# Patient Record
Sex: MALE | Race: WHITE | NOT HISPANIC OR LATINO | Employment: FULL TIME | URBAN - METROPOLITAN AREA
[De-identification: names, ages, dates, MRNs, and addresses within clinical notes are randomized per-mention and may not be internally consistent; named-entity substitution may affect disease eponyms.]

---

## 2017-07-25 ENCOUNTER — HOSPITAL ENCOUNTER (OUTPATIENT)
Dept: RADIOLOGY | Facility: HOSPITAL | Age: 52
Discharge: HOME | End: 2017-07-25
Attending: INTERNAL MEDICINE
Payer: COMMERCIAL

## 2017-07-25 ENCOUNTER — TRANSCRIBE ORDERS (OUTPATIENT)
Dept: ADMINISTRATIVE | Facility: HOSPITAL | Age: 52
End: 2017-07-25

## 2017-07-25 DIAGNOSIS — R06.02 SOB (SHORTNESS OF BREATH): ICD-10-CM

## 2017-07-25 DIAGNOSIS — R06.02 SOB (SHORTNESS OF BREATH): Primary | ICD-10-CM

## 2017-07-25 PROCEDURE — 71020 HB CHEST X-RAY 2VW FRONTAL&LATL: CPT

## 2017-07-26 ENCOUNTER — TRANSCRIBE ORDERS (OUTPATIENT)
Dept: ADMINISTRATIVE | Facility: HOSPITAL | Age: 52
End: 2017-07-26

## 2017-07-26 DIAGNOSIS — J98.11 ATELECTASIS: Primary | ICD-10-CM

## 2017-08-01 ENCOUNTER — HOSPITAL ENCOUNTER (OUTPATIENT)
Dept: RADIOLOGY | Facility: HOSPITAL | Age: 52
Discharge: HOME | End: 2017-08-01
Attending: INTERNAL MEDICINE
Payer: COMMERCIAL

## 2017-08-01 DIAGNOSIS — J98.11 ATELECTASIS: ICD-10-CM

## 2017-08-01 PROCEDURE — 71260 CT THORAX DX C+: CPT

## 2017-08-01 RX ADMIN — IOHEXOL 100 ML: 350 INJECTION, SOLUTION INTRAVENOUS at 08:05

## 2017-09-06 ENCOUNTER — APPOINTMENT (OUTPATIENT)
Dept: RADIOLOGY | Facility: CLINIC | Age: 52
End: 2017-09-06
Payer: COMMERCIAL

## 2017-09-06 ENCOUNTER — ALLSCRIPTS OFFICE VISIT (OUTPATIENT)
Dept: OTHER | Facility: OTHER | Age: 52
End: 2017-09-06

## 2017-09-06 DIAGNOSIS — M25.522 PAIN IN LEFT ELBOW: ICD-10-CM

## 2017-09-06 PROCEDURE — 73080 X-RAY EXAM OF ELBOW: CPT

## 2017-10-19 ENCOUNTER — GENERIC CONVERSION - ENCOUNTER (OUTPATIENT)
Dept: OTHER | Facility: OTHER | Age: 52
End: 2017-10-19

## 2018-01-14 VITALS
SYSTOLIC BLOOD PRESSURE: 130 MMHG | HEIGHT: 73 IN | HEART RATE: 93 BPM | BODY MASS INDEX: 38.24 KG/M2 | DIASTOLIC BLOOD PRESSURE: 84 MMHG | WEIGHT: 288.5 LBS

## 2018-01-22 VITALS
BODY MASS INDEX: 38.43 KG/M2 | HEIGHT: 73 IN | DIASTOLIC BLOOD PRESSURE: 85 MMHG | HEART RATE: 88 BPM | SYSTOLIC BLOOD PRESSURE: 132 MMHG | WEIGHT: 290 LBS

## 2020-08-27 ENCOUNTER — APPOINTMENT (EMERGENCY)
Dept: RADIOLOGY | Facility: HOSPITAL | Age: 55
DRG: 189 | End: 2020-08-27
Payer: COMMERCIAL

## 2020-08-27 ENCOUNTER — HOSPITAL ENCOUNTER (INPATIENT)
Facility: HOSPITAL | Age: 55
LOS: 2 days | Discharge: HOME | DRG: 189 | End: 2020-08-29
Attending: EMERGENCY MEDICINE | Admitting: INTERNAL MEDICINE
Payer: COMMERCIAL

## 2020-08-27 DIAGNOSIS — R09.02 HYPOXIA: ICD-10-CM

## 2020-08-27 DIAGNOSIS — J44.1 COPD EXACERBATION (HCC): Primary | ICD-10-CM

## 2020-08-27 PROBLEM — J96.01 ACUTE RESPIRATORY FAILURE WITH HYPOXIA (HCC): Status: ACTIVE | Noted: 2020-08-27

## 2020-08-27 PROBLEM — E78.5 HYPERLIPIDEMIA: Status: ACTIVE | Noted: 2020-08-27

## 2020-08-27 PROBLEM — G47.30 SLEEP APNEA: Status: ACTIVE | Noted: 2020-08-27

## 2020-08-27 LAB
ALBUMIN SERPL BCP-MCNC: 3.7 G/DL (ref 3.5–5)
ALP SERPL-CCNC: 54 U/L (ref 46–116)
ALT SERPL W P-5'-P-CCNC: 41 U/L (ref 12–78)
ANION GAP SERPL CALCULATED.3IONS-SCNC: 10 MMOL/L (ref 4–13)
APTT PPP: 30 SECONDS (ref 23–37)
ARTERIAL PATENCY WRIST A: YES
AST SERPL W P-5'-P-CCNC: 19 U/L (ref 5–45)
BASE EXCESS BLDA CALC-SCNC: -0.8 MMOL/L
BASOPHILS # BLD AUTO: 0.15 THOUSANDS/ΜL (ref 0–0.1)
BASOPHILS NFR BLD AUTO: 1 % (ref 0–1)
BILIRUB SERPL-MCNC: 0.5 MG/DL (ref 0.2–1)
BILIRUB UR QL STRIP: NEGATIVE
BODY TEMPERATURE: 98.2 DEGREES FEHRENHEIT
BUN SERPL-MCNC: 17 MG/DL (ref 5–25)
CALCIUM SERPL-MCNC: 8.8 MG/DL (ref 8.3–10.1)
CHLORIDE SERPL-SCNC: 102 MMOL/L (ref 100–108)
CLARITY UR: CLEAR
CO2 SERPL-SCNC: 26 MMOL/L (ref 21–32)
COLOR UR: COLORLESS
CREAT SERPL-MCNC: 0.91 MG/DL (ref 0.6–1.3)
D DIMER PPP FEU-MCNC: 0.3 UG/ML FEU
EOSINOPHIL # BLD AUTO: 0.82 THOUSAND/ΜL (ref 0–0.61)
EOSINOPHIL NFR BLD AUTO: 8 % (ref 0–6)
ERYTHROCYTE [DISTWIDTH] IN BLOOD BY AUTOMATED COUNT: 12.2 % (ref 11.6–15.1)
GFR SERPL CREATININE-BSD FRML MDRD: 93 ML/MIN/1.73SQ M
GLUCOSE SERPL-MCNC: 98 MG/DL (ref 65–140)
GLUCOSE UR STRIP-MCNC: NEGATIVE MG/DL
HCO3 BLDA-SCNC: 23.3 MMOL/L (ref 22–28)
HCT VFR BLD AUTO: 45.7 % (ref 36.5–49.3)
HGB BLD-MCNC: 14.9 G/DL (ref 12–17)
HGB UR QL STRIP.AUTO: NEGATIVE
IMM GRANULOCYTES # BLD AUTO: 0.04 THOUSAND/UL (ref 0–0.2)
IMM GRANULOCYTES NFR BLD AUTO: 0 % (ref 0–2)
INR PPP: 1.08 (ref 0.84–1.19)
KETONES UR STRIP-MCNC: NEGATIVE MG/DL
LACTATE SERPL-SCNC: 0.8 MMOL/L (ref 0.5–2)
LEUKOCYTE ESTERASE UR QL STRIP: NEGATIVE
LYMPHOCYTES # BLD AUTO: 3.46 THOUSANDS/ΜL (ref 0.6–4.47)
LYMPHOCYTES NFR BLD AUTO: 32 % (ref 14–44)
MCH RBC QN AUTO: 30.8 PG (ref 26.8–34.3)
MCHC RBC AUTO-ENTMCNC: 32.6 G/DL (ref 31.4–37.4)
MCV RBC AUTO: 94 FL (ref 82–98)
MONOCYTES # BLD AUTO: 0.62 THOUSAND/ΜL (ref 0.17–1.22)
MONOCYTES NFR BLD AUTO: 6 % (ref 4–12)
NASAL CANNULA: 4
NEUTROPHILS # BLD AUTO: 5.63 THOUSANDS/ΜL (ref 1.85–7.62)
NEUTS SEG NFR BLD AUTO: 53 % (ref 43–75)
NITRITE UR QL STRIP: NEGATIVE
NRBC BLD AUTO-RTO: 0 /100 WBCS
NT-PROBNP SERPL-MCNC: 24 PG/ML
O2 CT BLDA-SCNC: 20.2 ML/DL (ref 16–23)
OXYHGB MFR BLDA: 91.4 % (ref 94–97)
PCO2 BLDA: 37.3 MM HG (ref 36–44)
PCO2 TEMP ADJ BLDA: 37 MM HG (ref 36–44)
PH BLD: 7.42 [PH] (ref 7.35–7.45)
PH BLDA: 7.41 [PH] (ref 7.35–7.45)
PH UR STRIP.AUTO: 6 [PH]
PLATELET # BLD AUTO: 238 THOUSANDS/UL (ref 149–390)
PMV BLD AUTO: 9.1 FL (ref 8.9–12.7)
PO2 BLD: 65 MM HG (ref 75–129)
PO2 BLDA: 65.9 MM HG (ref 75–129)
POTASSIUM SERPL-SCNC: 3.8 MMOL/L (ref 3.5–5.3)
PROT SERPL-MCNC: 7 G/DL (ref 6.4–8.2)
PROT UR STRIP-MCNC: NEGATIVE MG/DL
PROTHROMBIN TIME: 13.9 SECONDS (ref 11.6–14.5)
RBC # BLD AUTO: 4.84 MILLION/UL (ref 3.88–5.62)
SARS-COV-2 RNA RESP QL NAA+PROBE: NEGATIVE
SODIUM SERPL-SCNC: 138 MMOL/L (ref 136–145)
SP GR UR STRIP.AUTO: <=1.005 (ref 1–1.03)
SPECIMEN SOURCE: ABNORMAL
TROPONIN I SERPL-MCNC: <0.02 NG/ML
UROBILINOGEN UR QL STRIP.AUTO: 0.2 E.U./DL
WBC # BLD AUTO: 10.72 THOUSAND/UL (ref 4.31–10.16)

## 2020-08-27 PROCEDURE — 94640 AIRWAY INHALATION TREATMENT: CPT

## 2020-08-27 PROCEDURE — 84145 PROCALCITONIN (PCT): CPT | Performed by: NURSE PRACTITIONER

## 2020-08-27 PROCEDURE — 85730 THROMBOPLASTIN TIME PARTIAL: CPT | Performed by: EMERGENCY MEDICINE

## 2020-08-27 PROCEDURE — 82805 BLOOD GASES W/O2 SATURATION: CPT | Performed by: EMERGENCY MEDICINE

## 2020-08-27 PROCEDURE — 36415 COLL VENOUS BLD VENIPUNCTURE: CPT | Performed by: EMERGENCY MEDICINE

## 2020-08-27 PROCEDURE — 83605 ASSAY OF LACTIC ACID: CPT | Performed by: EMERGENCY MEDICINE

## 2020-08-27 PROCEDURE — 94660 CPAP INITIATION&MGMT: CPT

## 2020-08-27 PROCEDURE — 83880 ASSAY OF NATRIURETIC PEPTIDE: CPT | Performed by: EMERGENCY MEDICINE

## 2020-08-27 PROCEDURE — 81003 URINALYSIS AUTO W/O SCOPE: CPT | Performed by: EMERGENCY MEDICINE

## 2020-08-27 PROCEDURE — 99285 EMERGENCY DEPT VISIT HI MDM: CPT

## 2020-08-27 PROCEDURE — 96374 THER/PROPH/DIAG INJ IV PUSH: CPT

## 2020-08-27 PROCEDURE — 94002 VENT MGMT INPAT INIT DAY: CPT

## 2020-08-27 PROCEDURE — 94664 DEMO&/EVAL PT USE INHALER: CPT

## 2020-08-27 PROCEDURE — 94760 N-INVAS EAR/PLS OXIMETRY 1: CPT

## 2020-08-27 PROCEDURE — 85025 COMPLETE CBC W/AUTO DIFF WBC: CPT | Performed by: EMERGENCY MEDICINE

## 2020-08-27 PROCEDURE — 99223 1ST HOSP IP/OBS HIGH 75: CPT | Performed by: NURSE PRACTITIONER

## 2020-08-27 PROCEDURE — 71045 X-RAY EXAM CHEST 1 VIEW: CPT

## 2020-08-27 PROCEDURE — 36600 WITHDRAWAL OF ARTERIAL BLOOD: CPT

## 2020-08-27 PROCEDURE — 87040 BLOOD CULTURE FOR BACTERIA: CPT | Performed by: EMERGENCY MEDICINE

## 2020-08-27 PROCEDURE — 80053 COMPREHEN METABOLIC PANEL: CPT | Performed by: EMERGENCY MEDICINE

## 2020-08-27 PROCEDURE — 84484 ASSAY OF TROPONIN QUANT: CPT | Performed by: EMERGENCY MEDICINE

## 2020-08-27 PROCEDURE — 85610 PROTHROMBIN TIME: CPT | Performed by: EMERGENCY MEDICINE

## 2020-08-27 PROCEDURE — 93005 ELECTROCARDIOGRAM TRACING: CPT

## 2020-08-27 PROCEDURE — 85379 FIBRIN DEGRADATION QUANT: CPT | Performed by: EMERGENCY MEDICINE

## 2020-08-27 PROCEDURE — 99285 EMERGENCY DEPT VISIT HI MDM: CPT | Performed by: EMERGENCY MEDICINE

## 2020-08-27 PROCEDURE — 87635 SARS-COV-2 COVID-19 AMP PRB: CPT | Performed by: EMERGENCY MEDICINE

## 2020-08-27 RX ORDER — ATORVASTATIN CALCIUM 10 MG/1
10 TABLET, FILM COATED ORAL DAILY
Status: DISCONTINUED | OUTPATIENT
Start: 2020-08-28 | End: 2020-08-29 | Stop reason: HOSPADM

## 2020-08-27 RX ORDER — FLUTICASONE PROPIONATE 50 MCG
2 SPRAY, SUSPENSION (ML) NASAL DAILY
COMMUNITY

## 2020-08-27 RX ORDER — IPRATROPIUM BROMIDE AND ALBUTEROL SULFATE 2.5; .5 MG/3ML; MG/3ML
3 SOLUTION RESPIRATORY (INHALATION)
Status: DISCONTINUED | OUTPATIENT
Start: 2020-08-27 | End: 2020-08-28

## 2020-08-27 RX ORDER — IPRATROPIUM BROMIDE AND ALBUTEROL SULFATE 2.5; .5 MG/3ML; MG/3ML
3 SOLUTION RESPIRATORY (INHALATION) ONCE
Status: COMPLETED | OUTPATIENT
Start: 2020-08-27 | End: 2020-08-27

## 2020-08-27 RX ORDER — LORATADINE 10 MG/1
10 TABLET ORAL DAILY
Status: DISCONTINUED | OUTPATIENT
Start: 2020-08-28 | End: 2020-08-29 | Stop reason: HOSPADM

## 2020-08-27 RX ORDER — METHYLPREDNISOLONE SODIUM SUCCINATE 40 MG/ML
40 INJECTION, POWDER, LYOPHILIZED, FOR SOLUTION INTRAMUSCULAR; INTRAVENOUS EVERY 8 HOURS
Status: DISCONTINUED | OUTPATIENT
Start: 2020-08-28 | End: 2020-08-28

## 2020-08-27 RX ORDER — ALBUTEROL SULFATE 90 UG/1
2 AEROSOL, METERED RESPIRATORY (INHALATION) EVERY 4 HOURS PRN
COMMUNITY
Start: 2020-07-20 | End: 2021-07-20

## 2020-08-27 RX ORDER — FLUTICASONE PROPIONATE 50 MCG
2 SPRAY, SUSPENSION (ML) NASAL DAILY
Status: DISCONTINUED | OUTPATIENT
Start: 2020-08-28 | End: 2020-08-29 | Stop reason: HOSPADM

## 2020-08-27 RX ORDER — ASPIRIN 81 MG/1
81 TABLET, CHEWABLE ORAL DAILY
Status: DISCONTINUED | OUTPATIENT
Start: 2020-08-28 | End: 2020-08-29 | Stop reason: HOSPADM

## 2020-08-27 RX ORDER — METHYLPREDNISOLONE SODIUM SUCCINATE 125 MG/2ML
125 INJECTION, POWDER, LYOPHILIZED, FOR SOLUTION INTRAMUSCULAR; INTRAVENOUS ONCE
Status: COMPLETED | OUTPATIENT
Start: 2020-08-27 | End: 2020-08-27

## 2020-08-27 RX ORDER — ATORVASTATIN CALCIUM 10 MG/1
10 TABLET, FILM COATED ORAL DAILY
COMMUNITY
Start: 2020-03-11 | End: 2021-03-11

## 2020-08-27 RX ORDER — TIOTROPIUM BROMIDE AND OLODATEROL 3.124; 2.736 UG/1; UG/1
2 SPRAY, METERED RESPIRATORY (INHALATION) DAILY
COMMUNITY
Start: 2020-08-27

## 2020-08-27 RX ORDER — SODIUM CHLORIDE 9 MG/ML
75 INJECTION, SOLUTION INTRAVENOUS CONTINUOUS
Status: DISCONTINUED | OUTPATIENT
Start: 2020-08-27 | End: 2020-08-28

## 2020-08-27 RX ORDER — LORATADINE 10 MG/1
TABLET ORAL
COMMUNITY
Start: 2020-08-27

## 2020-08-27 RX ADMIN — SODIUM CHLORIDE 75 ML/HR: 0.9 INJECTION, SOLUTION INTRAVENOUS at 23:04

## 2020-08-27 RX ADMIN — IPRATROPIUM BROMIDE AND ALBUTEROL SULFATE 3 ML: 2.5; .5 SOLUTION RESPIRATORY (INHALATION) at 22:27

## 2020-08-27 RX ADMIN — METHYLPREDNISOLONE SODIUM SUCCINATE 125 MG: 125 INJECTION, POWDER, FOR SOLUTION INTRAMUSCULAR; INTRAVENOUS at 17:47

## 2020-08-27 RX ADMIN — IPRATROPIUM BROMIDE AND ALBUTEROL SULFATE 3 ML: 2.5; .5 SOLUTION RESPIRATORY (INHALATION) at 17:41

## 2020-08-28 LAB
ANION GAP SERPL CALCULATED.3IONS-SCNC: 9 MMOL/L (ref 4–13)
BUN SERPL-MCNC: 18 MG/DL (ref 5–25)
CALCIUM SERPL-MCNC: 8.6 MG/DL (ref 8.3–10.1)
CHLORIDE SERPL-SCNC: 104 MMOL/L (ref 100–108)
CO2 SERPL-SCNC: 22 MMOL/L (ref 21–32)
CREAT SERPL-MCNC: 0.93 MG/DL (ref 0.6–1.3)
ERYTHROCYTE [DISTWIDTH] IN BLOOD BY AUTOMATED COUNT: 12.2 % (ref 11.6–15.1)
GFR SERPL CREATININE-BSD FRML MDRD: 91 ML/MIN/1.73SQ M
GLUCOSE SERPL-MCNC: 158 MG/DL (ref 65–140)
HCT VFR BLD AUTO: 46.9 % (ref 36.5–49.3)
HGB BLD-MCNC: 14.9 G/DL (ref 12–17)
MCH RBC QN AUTO: 30.5 PG (ref 26.8–34.3)
MCHC RBC AUTO-ENTMCNC: 31.8 G/DL (ref 31.4–37.4)
MCV RBC AUTO: 96 FL (ref 82–98)
PLATELET # BLD AUTO: 223 THOUSANDS/UL (ref 149–390)
PMV BLD AUTO: 9.5 FL (ref 8.9–12.7)
POTASSIUM SERPL-SCNC: 4.5 MMOL/L (ref 3.5–5.3)
PROCALCITONIN SERPL-MCNC: <0.05 NG/ML
RBC # BLD AUTO: 4.89 MILLION/UL (ref 3.88–5.62)
SODIUM SERPL-SCNC: 135 MMOL/L (ref 136–145)
WBC # BLD AUTO: 7.26 THOUSAND/UL (ref 4.31–10.16)

## 2020-08-28 PROCEDURE — 94640 AIRWAY INHALATION TREATMENT: CPT

## 2020-08-28 PROCEDURE — 99232 SBSQ HOSP IP/OBS MODERATE 35: CPT | Performed by: INTERNAL MEDICINE

## 2020-08-28 PROCEDURE — 94660 CPAP INITIATION&MGMT: CPT

## 2020-08-28 PROCEDURE — 85027 COMPLETE CBC AUTOMATED: CPT | Performed by: NURSE PRACTITIONER

## 2020-08-28 PROCEDURE — 94760 N-INVAS EAR/PLS OXIMETRY 1: CPT

## 2020-08-28 PROCEDURE — 80048 BASIC METABOLIC PNL TOTAL CA: CPT | Performed by: NURSE PRACTITIONER

## 2020-08-28 PROCEDURE — 94664 DEMO&/EVAL PT USE INHALER: CPT

## 2020-08-28 PROCEDURE — 93005 ELECTROCARDIOGRAM TRACING: CPT

## 2020-08-28 RX ORDER — LEVALBUTEROL INHALATION SOLUTION 0.63 MG/3ML
0.63 SOLUTION RESPIRATORY (INHALATION) EVERY 6 HOURS PRN
Status: DISCONTINUED | OUTPATIENT
Start: 2020-08-28 | End: 2020-08-29 | Stop reason: HOSPADM

## 2020-08-28 RX ORDER — LEVALBUTEROL INHALATION SOLUTION 0.63 MG/3ML
0.63 SOLUTION RESPIRATORY (INHALATION)
Status: DISCONTINUED | OUTPATIENT
Start: 2020-08-28 | End: 2020-08-29 | Stop reason: HOSPADM

## 2020-08-28 RX ORDER — ACETAMINOPHEN 325 MG/1
650 TABLET ORAL EVERY 6 HOURS PRN
Status: DISCONTINUED | OUTPATIENT
Start: 2020-08-28 | End: 2020-08-29 | Stop reason: HOSPADM

## 2020-08-28 RX ORDER — METHYLPREDNISOLONE SODIUM SUCCINATE 40 MG/ML
40 INJECTION, POWDER, LYOPHILIZED, FOR SOLUTION INTRAMUSCULAR; INTRAVENOUS EVERY 12 HOURS SCHEDULED
Status: DISCONTINUED | OUTPATIENT
Start: 2020-08-28 | End: 2020-08-29 | Stop reason: HOSPADM

## 2020-08-28 RX ORDER — GUAIFENESIN 600 MG
600 TABLET, EXTENDED RELEASE 12 HR ORAL EVERY 12 HOURS SCHEDULED
Status: DISCONTINUED | OUTPATIENT
Start: 2020-08-28 | End: 2020-08-29 | Stop reason: HOSPADM

## 2020-08-28 RX ADMIN — METHYLPREDNISOLONE SODIUM SUCCINATE 40 MG: 40 INJECTION, POWDER, FOR SOLUTION INTRAMUSCULAR; INTRAVENOUS at 20:47

## 2020-08-28 RX ADMIN — GUAIFENESIN 600 MG: 600 TABLET, EXTENDED RELEASE ORAL at 20:47

## 2020-08-28 RX ADMIN — IPRATROPIUM BROMIDE 0.5 MG: 0.5 SOLUTION RESPIRATORY (INHALATION) at 20:04

## 2020-08-28 RX ADMIN — FLUTICASONE PROPIONATE 2 SPRAY: 50 SPRAY, METERED NASAL at 08:35

## 2020-08-28 RX ADMIN — ENOXAPARIN SODIUM 40 MG: 40 INJECTION SUBCUTANEOUS at 08:35

## 2020-08-28 RX ADMIN — LORATADINE 10 MG: 10 TABLET ORAL at 08:35

## 2020-08-28 RX ADMIN — ATORVASTATIN CALCIUM 10 MG: 10 TABLET, FILM COATED ORAL at 08:35

## 2020-08-28 RX ADMIN — METHYLPREDNISOLONE SODIUM SUCCINATE 40 MG: 40 INJECTION, POWDER, FOR SOLUTION INTRAMUSCULAR; INTRAVENOUS at 05:20

## 2020-08-28 RX ADMIN — IPRATROPIUM BROMIDE AND ALBUTEROL SULFATE 3 ML: 2.5; .5 SOLUTION RESPIRATORY (INHALATION) at 14:30

## 2020-08-28 RX ADMIN — IPRATROPIUM BROMIDE AND ALBUTEROL SULFATE 3 ML: 2.5; .5 SOLUTION RESPIRATORY (INHALATION) at 02:42

## 2020-08-28 RX ADMIN — ASPIRIN 81 MG 81 MG: 81 TABLET ORAL at 08:35

## 2020-08-28 RX ADMIN — IPRATROPIUM BROMIDE AND ALBUTEROL SULFATE 3 ML: 2.5; .5 SOLUTION RESPIRATORY (INHALATION) at 07:22

## 2020-08-28 RX ADMIN — LEVALBUTEROL HYDROCHLORIDE 0.63 MG: 0.63 SOLUTION RESPIRATORY (INHALATION) at 20:04

## 2020-08-28 NOTE — UTILIZATION REVIEW
Initial Clinical Review    Admission: Date/Time/Statement:   Admission Orders (From admission, onward)     Ordered        08/27/20 1924  Inpatient Admission  Once                   Orders Placed This Encounter   Procedures    Inpatient Admission     Standing Status:   Standing     Number of Occurrences:   1     Order Specific Question:   Admitting Physician     Answer:   Evangelina Moody     Order Specific Question:   Level of Care     Answer:   Med Surg [16]     Order Specific Question:   Estimated length of stay     Answer:   More than 2 Midnights     Order Specific Question:   Certification     Answer:   I certify that inpatient services are medically necessary for this patient for a duration of greater than two midnights  See H&P and MD Progress Notes for additional information about the patient's course of treatment  ED Arrival Information     Expected Arrival Acuity Means of Arrival Escorted By Service Admission Type    - 8/27/2020 16:45 Emergent Walk-In Family Member Hospitalist Emergency    Arrival Complaint    shortness of breath        Chief Complaint   Patient presents with    Shortness of Breath     Patient with hx of COPD, quit smoking May, on CPAP at [facility], no pulmonologist  For past 3-4 days having a hard time breathing and his inhaler is not working  Coughing up white to green sputum last 2 weeks  Sat 88-91 % with severe MIRANDA     Assessment/Plan: 61 yo male to ED from home pmh COPD,recently stopped smoking and since that time having breathing difficulties  In ED he is tachycardia, tachypnea ,accessory muscle usage, decreased breath sounds with wheezing  Pt admitted Inpatient admission with acute exacerbation COPD ,acute hypoxic respiratory failure  sats 88% in ED continue duonebs rtc, IV Solumedrol , supplement oxygen requiring 4L nc    Sleep apnea continue cpap hx    8/25  Tachypnea with mild conversation,decreased breath sounds ,wheezing continue IV Solumedrol same dosing and nebs atc ,wean oxygen as able  ED Triage Vitals   Temperature Pulse Respirations Blood Pressure SpO2   08/27/20 1700 08/27/20 1700 08/27/20 1700 08/27/20 1700 08/27/20 1700   98 2 °F (36 8 °C) 102 (!) 32 147/82 90 %      Temp Source Heart Rate Source Patient Position - Orthostatic VS BP Location FiO2 (%)   08/27/20 1700 08/27/20 1700 08/27/20 1700 08/27/20 1700 --   Tympanic Monitor Sitting Left arm       Pain Score       08/27/20 2045       No Pain          Wt Readings from Last 1 Encounters:   08/27/20 127 kg (281 lb 1 4 oz)     Additional Vital Signs:   08/27/20 2230      96   20         91 %   36   4 L/min   Nasal cannula       08/27/20 2207                  90 %         Nasal cannula       08/27/20 20:49:59   98 2 °F (36 8 °C)   97   19   157/90   112   90 %   36   4 L/min   Nasal cannula   Lying    08/27/20 2000      94   16   154/82   109   91 %   36   4 L/min   Nasal cannula       08/27/20 1935                           Nasal cannula       08/27/20 1930      98   18   160/82   115   91 %   36   4 L/min          08/27/20 1915      92   23Abnormal           90 %   36   4 L/min   Nasal cannula       08/27/20 1829      95   20   147/80      90 %   36   4 L/min   Nasal cannula   Sitting    08/27/20 1801      99   24Abnormal     154/76      91 %   36   4 L/min   Nasal cannula       Pertinent Labs/Diagnostic Test Results:   8/27/20 CXR Asymmetric right basal opacity, favor asymmetric chest wall tissue, however may also represent layering pleural effusion   Results from last 7 days   Lab Units 08/27/20  1912   SARS-COV-2  Negative     Results from last 7 days   Lab Units 08/28/20  0516 08/27/20  1746   WBC Thousand/uL 7 26 10 72*   HEMOGLOBIN g/dL 14 9 14 9   HEMATOCRIT % 46 9 45 7   PLATELETS Thousands/uL 223 238   NEUTROS ABS Thousands/µL  --  5 63     Results from last 7 days   Lab Units 08/28/20  0516 08/27/20  1746   SODIUM mmol/L 135* 138   POTASSIUM mmol/L 4 5 3 8   CHLORIDE mmol/L 104 102   CO2 mmol/L 22 26   ANION GAP mmol/L 9 10   BUN mg/dL 18 17   CREATININE mg/dL 0 93 0 91   EGFR ml/min/1 73sq m 91 93   CALCIUM mg/dL 8 6 8 8     Results from last 7 days   Lab Units 08/27/20  1746   AST U/L 19   ALT U/L 41   ALK PHOS U/L 54   TOTAL PROTEIN g/dL 7 0   ALBUMIN g/dL 3 7   TOTAL BILIRUBIN mg/dL 0 50     Results from last 7 days   Lab Units 08/28/20  0516 08/27/20  1746   GLUCOSE RANDOM mg/dL 158* 98      Results from last 7 days   Lab Units 08/27/20  1903   PH ART  7 414   PCO2 ART mm Hg 37 3   PO2 ART mm Hg 65 9*   HCO3 ART mmol/L 23 3   BASE EXC ART mmol/L -0 8   O2 CONTENT ART mL/dL 20 2   O2 HGB, ARTERIAL % 91 4*   ABG SOURCE  Radial, Left     Results from last 7 days   Lab Units 08/27/20  1746   TROPONIN I ng/mL <0 02     Results from last 7 days   Lab Units 08/27/20  1746   D-DIMER QUANTITATIVE ug/ml FEU 0 30     Results from last 7 days   Lab Units 08/27/20  1746   PROTIME seconds 13 9   INR  1 08   PTT seconds 30     Results from last 7 days   Lab Units 08/27/20  2303   PROCALCITONIN ng/ml <0 05     Results from last 7 days   Lab Units 08/27/20  1844   LACTIC ACID mmol/L 0 8     Results from last 7 days   Lab Units 08/27/20  1746   NT-PRO BNP pg/mL 24     Results from last 7 days   Lab Units 08/27/20  1747   CLARITY UA  Clear   COLOR UA  Colorless   SPEC GRAV UA  <=1 005   PH UA  6 0   GLUCOSE UA mg/dl Negative   KETONES UA mg/dl Negative   BLOOD UA  Negative   PROTEIN UA mg/dl Negative   NITRITE UA  Negative   BILIRUBIN UA  Negative   UROBILINOGEN UA E U /dl 0 2   LEUKOCYTES UA  Negative     Results from last 7 days   Lab Units 08/27/20  1844   BLOOD CULTURE  Received in Microbiology Lab  Culture in Progress  Received in Microbiology Lab  Culture in Progress         ED Treatment:   Medication Administration from 08/27/2020 1645 to 08/27/2020 2040       Date/Time Order Dose Route Action Action by Comments     08/27/2020 1741 ipratropium-albuterol (DUO-NEB) 0 5-2 5 mg/3 mL inhalation solution 3 mL 3 mL Nebulization Given Bandar Javier RN      08/27/2020 1747 methylPREDNISolone sodium succinate (Solu-MEDROL) injection 125 mg 125 mg Intravenous Given Bandar Javier RN         Past Medical History:   Diagnosis Date    COPD (chronic obstructive pulmonary disease) ([facility] [address] )     History of continuous positive airway pressure (CPAP) therapy at home     Hx of seasonal allergies     Sleep apnea      Present on Admission:   COPD exacerbation (HCC)      Admitting Diagnosis: Shortness of breath [R06 02]  Hypoxia [R09 02]  COPD exacerbation (HCC) [J44 1]  Age/Sex: 60 y o  male  Admission Orders:  gmf  Respiratory protocol  cpap hs   Oxygen 4L  Nebs q6hr and prn   cmp cbc diff   procalcitonin  IS  Scheduled Medications:  aspirin, 81 mg, Oral, Daily  atorvastatin, 10 mg, Oral, Daily  enoxaparin, 40 mg, Subcutaneous, Q24H DARA  fluticasone, 2 spray, Nasal, Daily  ipratropium-albuterol, 3 mL, Nebulization, Q6H  loratadine, 10 mg, Oral, Daily  methylPREDNISolone sodium succinate, 40 mg, Intravenous, Q12H [facility] & halfway      Continuous IV Infusions:     PRN Meds:       None    Network Utilization Review Department  [email or web address]  ATTENTION: Please call with any questions or concerns to [phone] and carefully listen to the prompts so that you are directed to the right person  All voicemails are confidential   Evaristo Delude all requests for admission clinical reviews, approved or denied determinations and any other requests to dedicated fax number below belonging to the campus where the patient is receiving treatment   List of dedicated fax numbers for the Facilities:  FACILITY NAME UR FAX NUMBER   ADMISSION DENIALS (Administrative/Medical Necessity) [phone]   [address]  (Maternity/NICU/Pediatrics) [phone]   Sanaz Wolf [address] [facility] [address] [facility] [phone]   Haven Grieves [facility] [phone] [address] [facility], [facility]  [phone]   [address] [place] [address] [phone]

## 2020-08-28 NOTE — H&P
H&P- Saintclair Parson 1965, 60 y o  male MRN: [id number]    Unit/Bed#: [address] [address] Encounter: [id number]    Primary Care Provider: Levar Horne PA-C   Date and time admitted to hospital: 8/27/2020  4:58 PM      * COPD exacerbation [facility])  Assessment & Plan  Patient presents to the emergency department complaints of shortness of breath with exertion for about 3 days  History of COPD  He has a chronic nonproductive cough, denies fevers or chills  Formal CXR read pending  · Admit to medicine  · Continue duonebs RTC  · Continue solumedrol at 40 mg IV q 8 hrs  · No antibiotics at this time  · Respiratory protocol  · Supplemental oxygen PRN    Acute respiratory failure with hypoxia ([address] )  Assessment & Plan  Secondary to COPD exacerbation  Plan per above    Hyperlipidemia  Assessment & Plan  Continue statin    Sleep apnea  Assessment & Plan  Continue CPAP at 13 per home routine    VTE Prophylaxis: Enoxaparin (Lovenox)  / sequential compression device   Code Status: Level 1 - Full Code    Anticipated Length of Stay:  Patient will be admitted on an Inpatient basis with an anticipated length of stay of greater than 2 midnights  Justification for Hospital Stay: COPD exacerbation     Total Time for Visit, including Counseling / Coordination of Care: 20 minutes  Greater than 50% of this total time spent on direct patient counseling and coordination of care  Chief Complaint:   Shortness of Breath (Patient with hx of COPD, quit smoking May, on CPAP at , no pulmonologist  For past 3-4 days having a hard time breathing and his inhaler is not working  Coughing up white to green sputum last 2 weeks  Sat 88-91 % with severe MIRANDA)      History of Present Illness:    Saintclair Parson is a 60 y o  male with a PMH of COPD, hyperlipidemia, obstructive sleep apnea who presents with shortness of breath  Patient states he has a long-time history of tobacco dependence most recently quitting in May    For the past 3-4 days he reports having difficulty breathing  He has been taking his p r n  Albuterol inhaler with little relief  He has a chronic nonproductive cough  On arrival to the emergency department he was found to have an oxygen saturation of 88%  ABG was normal aside from hypoxia  Labs are essentially unremarkable  Chest x-ray is pending formal read but does not appear to have any acute findings  Patient is admitted for acute respiratory failure with hypoxia secondary to COPD exacerbation  Review of Systems:    Review of Systems   Constitutional: Negative  HENT: Negative  Eyes: Negative  Respiratory: Positive for cough, shortness of breath and wheezing  Cardiovascular: Negative  Gastrointestinal: Negative  Endocrine: Negative  Genitourinary: Negative  Musculoskeletal: Negative  Skin: Negative  Allergic/Immunologic: Negative  Neurological: Negative  Hematological: Negative  Psychiatric/Behavioral: Negative  Past Medical and Surgical History:     Past Medical History:   Diagnosis Date    COPD (chronic obstructive pulmonary disease) ([facility] [address] )     History of continuous positive airway pressure (CPAP) therapy at home     Hx of seasonal allergies     Sleep apnea        Past Surgical History:   Procedure Laterality Date    APPENDECTOMY         Meds/Allergies:    Prior to Admission medications    Medication Sig Start Date End Date Taking?  Authorizing Provider   albuterol (PROVENTIL HFA,VENTOLIN HFA) 90 mcg/act inhaler Inhale 2 puffs every 4 (four) hours as needed 7/20/20 7/20/21 Yes Historical Provider, MD   ASPIRIN 81 PO Take 81 mg by mouth daily   Yes Historical Provider, MD   atorvastatin (LIPITOR) 10 mg tablet Take 10 mg by mouth daily 3/11/20 3/11/21 Yes Historical Provider, MD   fluticasone (FLONASE) 50 mcg/act nasal spray 2 sprays into each nostril daily   Yes Historical Provider, MD   loratadine (CLARITIN) 10 mg tablet TAKE ONE TABLET BY MOUTH EVERY DAY (Sloan Galo) 8/27/20  Yes Historical Provider, MD   tiotropium-olodaterol (Stiolto Respimat) 2 5-2 5 MCG/ACT inhaler Inhale 2 puffs daily 8/27/20  Yes Historical Provider, MD       Allergies: No Known Allergies    Social History:     Marital Status: Single   Substance Use History:   Social History     Substance and Sexual Activity   Alcohol Use Yes    Frequency: Monthly or less    Drinks per session: 1 or 2    Binge frequency: Never     Social History     Tobacco Use   Smoking Status Former Smoker    Packs/day: 2 00    Types: Cigarettes   Smokeless Tobacco Never Used   Tobacco Comment    started at age 10     Social History     Substance and Sexual Activity   Drug Use Never       Family History:    Family History   Problem Relation Age of Onset    Breast cancer Mother     Kidney failure Mother     Diabetes Maternal Grandmother        Physical Exam:     Vitals:   Blood Pressure: 157/90 (08/27/20 2049)  Pulse: 97 (08/27/20 2049)  Temperature: 98 2 °F (36 8 °C) (08/27/20 2049)  Temp Source: Oral (08/27/20 2049)  Respirations: 19 (08/27/20 2049)  Weight - Scale: 131 kg (288 lb) (08/27/20 1700)  SpO2: 90 % (08/27/20 2049)    Physical Exam  Constitutional:       Appearance: Normal appearance  HENT:      Head: Normocephalic and atraumatic  Nose: Nose normal    Eyes:      Extraocular Movements: Extraocular movements intact  Neck:      Musculoskeletal: Normal range of motion  Cardiovascular:      Rate and Rhythm: Normal rate and regular rhythm  Pulses: Normal pulses  Heart sounds: Murmur present  Pulmonary:      Effort: Pulmonary effort is normal  No respiratory distress  Breath sounds: Wheezing present  Abdominal:      General: Abdomen is flat  Bowel sounds are normal  There is no distension  Palpations: Abdomen is soft  Tenderness: There is no abdominal tenderness  Musculoskeletal: Normal range of motion  General: No swelling  Skin:     General: Skin is warm and dry  Neurological:      General: No focal deficit present  Mental Status: He is alert and oriented to person, place, and time  Psychiatric:         Mood and Affect: Mood normal          Behavior: Behavior normal            Additional Data:     Lab Results: I have personally reviewed pertinent reports  Results from last 7 days   Lab Units 08/27/20  1746   WBC Thousand/uL 10 72*   HEMOGLOBIN g/dL 14 9   HEMATOCRIT % 45 7   PLATELETS Thousands/uL 238   NEUTROS PCT % 53     Results from last 7 days   Lab Units 08/27/20  1746   SODIUM mmol/L 138   POTASSIUM mmol/L 3 8   CHLORIDE mmol/L 102   CO2 mmol/L 26   BUN mg/dL 17   CREATININE mg/dL 0 91   CALCIUM mg/dL 8 8   TOTAL BILIRUBIN mg/dL 0 50   ALK PHOS U/L 54   ALT U/L 41   AST U/L 19     Results from last 7 days   Lab Units 08/27/20  1746   INR  1 08     Results from last 7 days   Lab Units 08/27/20  1746   TROPONIN I ng/mL <0 02         Results from last 7 days   Lab Units 08/27/20  1844   LACTIC ACID mmol/L 0 8       Imaging: I have personally reviewed pertinent reports  XR chest 1 view portable    (Results Pending)       XR chest 1 view portable    (Results Pending)       EKG, Pathology, and Other Studies Reviewed on Admission:   · EKG: pending     Allscripts / Epic Records Reviewed: Yes     ** Please Note: This note has been constructed using a voice recognition system   **

## 2020-08-28 NOTE — PLAN OF CARE
Problem: INFECTION - ADULT  Goal: Absence or prevention of progression during hospitalization  Description: INTERVENTIONS:  - Assess and monitor for signs and symptoms of infection  - Monitor lab/diagnostic results  - Monitor all insertion sites, i e  indwelling lines, tubes, and drains  - Monitor endotracheal if appropriate and nasal secretions for changes in amount and color  - [place] appropriate cooling/warming therapies per order  - Administer medications as ordered  - Instruct and encourage patient and family to use good hand hygiene technique  - Identify and instruct in appropriate isolation precautions for identified infection/condition  Outcome: Progressing     Problem: SAFETY ADULT  Goal: Maintain or return to baseline ADL function  Description: INTERVENTIONS:  -  Assess patient's ability to carry out ADLs; assess patient's baseline for ADL function and identify physical deficits which impact ability to perform ADLs (bathing, care of mouth/teeth, toileting, grooming, dressing, etc )  - Assess/evaluate cause of self-care deficits   - Assess range of motion  - Assess patient's mobility; develop plan if impaired  - Assess patient's need for assistive devices and provide as appropriate  - Encourage maximum independence but intervene and supervise when necessary  - Involve family in performance of ADLs  - Assess for home care needs following discharge   - Consider OT consult to assist with ADL evaluation and planning for discharge  - Provide patient education as appropriate  Outcome: Progressing  Goal: Maintain or return mobility status to optimal level  Description: INTERVENTIONS:  - Assess patient's baseline mobility status (ambulation, transfers, stairs, etc )    - Identify cognitive and physical deficits and behaviors that affect mobility  - Identify mobility aids required to assist with transfers and/or ambulation (gait belt, sit-to-stand, lift, walker, cane, etc )  - [place] fall precautions as indicated by assessment  - Record patient progress and toleration of activity level on Mobility SBAR; progress patient to next Phase/Stage  - Instruct patient to call for assistance with activity based on assessment  - Consider rehabilitation consult to assist with strengthening/weightbearing, etc   Outcome: Progressing     Problem: DISCHARGE PLANNING  Goal: Discharge to home or other facility with appropriate resources  Description: INTERVENTIONS:  - Identify barriers to discharge w/patient and caregiver  - Arrange for needed discharge resources and transportation as appropriate  - Identify discharge learning needs (meds, wound care, etc )  support system  Outcome: Progressing     Problem: Knowledge Deficit  Goal: Patient/family/caregiver demonstrates understanding of disease process, treatment plan, medications, and discharge instructions  Description: Complete learning assessment and assess knowledge base    Interventions:  - Provide teaching at level of understanding  - Provide teaching via preferred learning methods  Outcome: Progressing     Problem: RESPIRATORY - ADULT  Goal: Achieves optimal ventilation and oxygenation  Description: INTERVENTIONS:  - Assess for changes in respiratory status  - Assess for changes in mentation and behavior  - Position to facilitate oxygenation and minimize respiratory effort  - Oxygen administered by appropriate delivery if ordered  - Initiate smoking cessation education as indicated  - Encourage broncho-pulmonary hygiene including cough, deep breathe, Incentive Spirometry  - Assess the need for suctioning and aspirate as needed  - Assess and instruct to report SOB or any respiratory difficulty  - Respiratory Therapy support as indicated  Outcome: Progressing  Goal: Achieves optimal ventilation and oxygenation  Description: INTERVENTIONS:  Outcome: Progressing

## 2020-08-28 NOTE — ASSESSMENT & PLAN NOTE
Patient presents to the emergency department complaints of shortness of breath with exertion for about 3 days  History of COPD  He has a chronic nonproductive cough, denies fevers or chills  Formal CXR read pending     · Admit to medicine  · Continue duonebs RTC  · Continue solumedrol at 40 mg IV q 8 hrs  · No antibiotics at this time  · Respiratory protocol  · Supplemental oxygen PRN

## 2020-08-28 NOTE — RESPIRATORY THERAPY NOTE
surya completed with patient  Patient performed adequately achieved 45 ml/min    Patient also given aerochamber for home use of mdi

## 2020-08-28 NOTE — PLAN OF CARE
Problem: INFECTION - ADULT  Goal: Absence or prevention of progression during hospitalization  Description: INTERVENTIONS:  - Assess and monitor for signs and symptoms of infection  - Monitor lab/diagnostic results  - Monitor all insertion sites, i e  indwelling lines, tubes, and drains  - Monitor endotracheal if appropriate and nasal secretions for changes in amount and color  - [place] appropriate cooling/warming therapies per order  - Administer medications as ordered  - Instruct and encourage patient and family to use good hand hygiene technique  - Identify and instruct in appropriate isolation precautions for identified infection/condition  Outcome: Progressing     Problem: SAFETY ADULT  Goal: Maintain or return to baseline ADL function  Description: INTERVENTIONS:  -  Assess patient's ability to carry out ADLs; assess patient's baseline for ADL function and identify physical deficits which impact ability to perform ADLs (bathing, care of mouth/teeth, toileting, grooming, dressing, etc )  - Assess/evaluate cause of self-care deficits   - Assess range of motion  - Assess patient's mobility; develop plan if impaired  - Assess patient's need for assistive devices and provide as appropriate  - Encourage maximum independence but intervene and supervise when necessary  - Involve family in performance of ADLs  - Assess for home care needs following discharge   - Consider OT consult to assist with ADL evaluation and planning for discharge  - Provide patient education as appropriate  Outcome: Progressing  Goal: Maintain or return mobility status to optimal level  Description: INTERVENTIONS:  - Assess patient's baseline mobility status (ambulation, transfers, stairs, etc )    - Identify cognitive and physical deficits and behaviors that affect mobility  - Identify mobility aids required to assist with transfers and/or ambulation (gait belt, sit-to-stand, lift, walker, cane, etc )  - [place] fall precautions as indicated by assessment  - Record patient progress and toleration of activity level on Mobility SBAR; progress patient to next Phase/Stage  - Instruct patient to call for assistance with activity based on assessment  - Consider rehabilitation consult to assist with strengthening/weightbearing, etc   Outcome: Progressing     Problem: DISCHARGE PLANNING  Goal: Discharge to home or other facility with appropriate resources  Description: INTERVENTIONS:  - Identify barriers to discharge w/patient and caregiver  - Arrange for needed discharge resources and transportation as appropriate  - Identify discharge learning needs (meds, wound care, etc )  support system  Outcome: Progressing     Problem: Knowledge Deficit  Goal: Patient/family/caregiver demonstrates understanding of disease process, treatment plan, medications, and discharge instructions  Description: Complete learning assessment and assess knowledge base    Interventions:  - Provide teaching at level of understanding  - Provide teaching via preferred learning methods  Outcome: Progressing

## 2020-08-28 NOTE — RESPIRATORY THERAPY NOTE
copd education and booklet given, s/s of copd exacerbation, zone tool, better breathers, inhaler use, maintenance vs bronchodilator mdi

## 2020-08-28 NOTE — PROGRESS NOTES
Progress Note - Zohreh Deutsch 1965, 60 y o  male MRN: [id number]    Unit/Bed#: [address] [address] Encounter: [id number]    Primary Care Provider: Joe Kramer PA-C   Date and time admitted to hospital: 8/27/2020  4:58 PM        * COPD exacerbation [facility])  Assessment & Plan  Patient presents to the emergency department complaints of shortness of breath with exertion for about 3 days  History of COPD  He has a chronic nonproductive cough, denies fevers or chills  CXR 8/27: Asymmetric right basal opacity, favor asymmetric chest wall tissue, however may also represent layering pleural effusion  · Hold home, Stiolto   · Continue duonebs RTC  · Reduce Solumedrol from 40 mg IV q 8h to q 12h  · No antibiotics at this time  · Respiratory protocol  · I S   · Supplemental oxygen PRN  · Patient will need a nebulizer on discharge and will need a follow-up CXR PA and lateral to evaluate the chest wall tissue     Acute respiratory failure with hypoxia ([address] )  Assessment & Plan  Secondary to COPD exacerbation  · Plan per above    Hyperlipidemia  Assessment & Plan  Continue statin    Sleep apnea  Assessment & Plan  Continue CPAP at 13 per home routine    VTE Pharmacologic Prophylaxis:   Pharmacologic: Enoxaparin (Lovenox)  Mechanical VTE Prophylaxis in Place: Yes    Patient Centered Rounds: I have performed bedside rounds with nursing staff today  Discussions with Specialists or Other Care Team Provider: nursing, cm    Education and Discussions with Family / Patient: I have answered all questions to the best of my ability  Time Spent for Care: 30 minutes  More than 50% of total time spent on counseling and coordination of care as described above      Current Length of Stay: 1 day(s)    Current Patient Status: Inpatient   Certification Statement: The patient will continue to require additional inpatient hospital stay due to COPD exac on IV Solumedrol     Discharge Plan: Patient is not medically stable for discharge today, likely in the next 24-48 hours pending progress  Code Status: Level 1 - Full Code      Subjective:   Resting comfortably in bed on 4 L nasal cannula  Overall, patient states his breathing is less labored today compared to yesterday  He states his moist cough is becoming looser and more productive  Describes the sputum as anywhere from white to yellow to green in color  Denies any chills or sweats  Does not use oxygen at home  Uses his maintenance inhalers and rescue inhalers as needed  Is interested in a nebulizer machine on discharge  Denies headache, dizziness, chest pain, abdominal pain, vomiting, or diarrhea  Objective:     Vitals:   Temp (24hrs), Av 1 °F (36 7 °C), Min:97 7 °F (36 5 °C), Max:98 2 °F (36 8 °C)    Temp:  [97 7 °F (36 5 °C)-98 2 °F (36 8 °C)] 97 7 °F (36 5 °C)  HR:  [] 93  Resp:  [16-32] 18  BP: (122-160)/(66-90) 147/80  SpO2:  [90 %-94 %] 91 %  Body mass index is 37 08 kg/m²  Input and Output Summary (last 24 hours): Intake/Output Summary (Last 24 hours) at 2020 1045  Last data filed at 2020 0945  Gross per 24 hour   Intake 1351 25 ml   Output    Net 1351 25 ml       Physical Exam:     Physical Exam  Vitals signs and nursing note reviewed  Constitutional:       General: He is not in acute distress  Appearance: He is well-developed  He is obese  He is not toxic-appearing or diaphoretic  HENT:      Head: Normocephalic  Neck:      Musculoskeletal: Normal range of motion  Cardiovascular:      Rate and Rhythm: Normal rate and regular rhythm  Pulmonary:      Effort: Pulmonary effort is normal  Tachypnea (Mild with conversation) present  No respiratory distress  Breath sounds: Examination of the right-upper field reveals decreased breath sounds  Examination of the left-upper field reveals decreased breath sounds  Examination of the right-lower field reveals decreased breath sounds   Examination of the left-lower field reveals wheezing  Decreased breath sounds and wheezing present  No rhonchi or rales  Abdominal:      General: Bowel sounds are normal  There is no distension  Palpations: Abdomen is soft  Tenderness: There is no abdominal tenderness  Musculoskeletal: Normal range of motion  General: No tenderness  Skin:     General: Skin is warm and dry  Capillary Refill: Capillary refill takes less than 2 seconds  Findings: No rash  Neurological:      Mental Status: He is alert and oriented to person, place, and time  Mental status is at baseline  Cranial Nerves: No cranial nerve deficit  Motor: No weakness  Psychiatric:         Mood and Affect: Mood normal          Behavior: Behavior normal          Thought Content: Thought content normal          Judgment: Judgment normal          Additional Data:     Labs:    Results from last 7 days   Lab Units 08/28/20  0516 08/27/20  1746   WBC Thousand/uL 7 26 10 72*   HEMOGLOBIN g/dL 14 9 14 9   HEMATOCRIT % 46 9 45 7   PLATELETS Thousands/uL 223 238   NEUTROS PCT %  --  53   LYMPHS PCT %  --  32   MONOS PCT %  --  6   EOS PCT %  --  8*     Results from last 7 days   Lab Units 08/28/20  0516 08/27/20  1746   POTASSIUM mmol/L 4 5 3 8   CHLORIDE mmol/L 104 102   CO2 mmol/L 22 26   BUN mg/dL 18 17   CREATININE mg/dL 0 93 0 91   CALCIUM mg/dL 8 6 8 8   ALK PHOS U/L  --  54   ALT U/L  --  41   AST U/L  --  19     Results from last 7 days   Lab Units 08/27/20  1746   INR  1 08       * I Have Reviewed All Lab Data Listed Above  * Additional Pertinent Lab Tests Reviewed: All Labs Within Last 24 Hours Reviewed    Imaging:    Imaging Reports Reviewed Today Include: CXR  Imaging Personally Reviewed by Myself Includes:  None     Recent Cultures (last 7 days):     Results from last 7 days   Lab Units 08/27/20  1844   BLOOD CULTURE  Received in Microbiology Lab  Culture in Progress  Received in Microbiology Lab  Culture in Progress         Last 24 Hours Medication List:   Current Facility-Administered Medications   Medication Dose Route Frequency Provider Last Rate    aspirin  81 mg Oral Daily JEREMIAH Villafuerte      atorvastatin  10 mg Oral Daily JEREMIAH Villafuerte      enoxaparin  40 mg Subcutaneous Q24H [address] JEREMIAH Villafuerte      fluticasone  2 spray Nasal Daily JEREMIAH Villafuerte      ipratropium-albuterol  3 mL Nebulization Q6H JEREMIAH Villafuerte      loratadine  10 mg Oral Daily JEREMIAH Villafuerte      methylPREDNISolone sodium succinate  40 mg Intravenous Q8H JEREMIAH Villafuerte          Today, Patient Was Seen By: JEREMIAH Brown    ** Please Note: Dictation voice to text software may have been used in the creation of this document   **

## 2020-08-28 NOTE — RESPIRATORY THERAPY NOTE
RT Protocol Note  Nevaeh Mullins 60 y o  male MRN: [id number]  Unit/Bed#: [address] [facility] 408-01 Encounter: [id number]    Assessment    Principal Problem:    COPD exacerbation ([facility] )  Active Problems:    Sleep apnea    Hyperlipidemia    Acute respiratory failure with hypoxia (HCC)      Home Pulmonary Medications:  Proventil inhaler  Home Devices/Therapy: BiPAP/CPAP    Past Medical History:   Diagnosis Date    COPD (chronic obstructive pulmonary disease) ([facility] )     History of continuous positive airway pressure (CPAP) therapy at home     Hx of seasonal allergies     Sleep apnea      Social History     Socioeconomic History    Marital status: Single     Spouse name: None    Number of children: None    Years of education: None    Highest education level: None   Occupational History    None   Social Needs    Financial resource strain: None    Food insecurity     Worry: None     Inability: None    Transportation needs     Medical: None     Non-medical: None   Tobacco Use    Smoking status: Former Smoker     Packs/day: 2 00     Types: Cigarettes    Smokeless tobacco: Never Used    Tobacco comment: started at age 10   Substance and Sexual Activity    Alcohol use: Yes     Frequency: Monthly or less     Drinks per session: 1 or 2     Binge frequency: Never    Drug use: Never    Sexual activity: None   Lifestyle    Physical activity     Days per week: None     Minutes per session: None    Stress: None   Relationships    Social connections     Talks on phone: None     Gets together: None     Attends Anabaptist service: None     Active member of club or organization: None     Attends meetings of clubs or organizations: None     Relationship status: None    Intimate partner violence     Fear of current or ex partner: None     Emotionally abused: None     Physically abused: None     Forced sexual activity: None   Other Topics Concern    None   Social History Narrative    None       Subjective Objective    Physical Exam:   Assessment Type: Pre-treatment  General Appearance: Awake, Alert  Respiratory Pattern: Normal, Dyspnea with exertion  Chest Assessment: Chest expansion symmetrical  Bilateral Breath Sounds: Diminished, Expiratory wheezes  R Breath Sounds: Diminished  L Breath Sounds: Diminished, Expiratory wheezes  Cough: Non-productive  O2 Device: NC    Vitals:  Blood pressure 157/90, pulse 96, temperature 98 2 °F (36 8 °C), temperature source Oral, resp  rate 20, height 6' 1" (1 854 m), weight 127 kg (281 lb 1 4 oz), SpO2 91 %      Results from last 7 days   Lab Units 08/27/20  1903   PH ART  7 414   PCO2 ART mm Hg 37 3   PO2 ART mm Hg 65 9*   HCO3 ART mmol/L 23 3   BASE EXC ART mmol/L -0 8   O2 CONTENT ART mL/dL 20 2   O2 HGB, ARTERIAL % 91 4*   ABG SOURCE  Radial, Left   NAA TEST  Yes           O2 Device: NC     Plan    Respiratory Plan: Vent/NIV/HFNC, Home Bronchodilator Patient pathway

## 2020-08-28 NOTE — ASSESSMENT & PLAN NOTE
Patient presents to the emergency department complaints of shortness of breath with exertion for about 3 days  History of COPD  He has a chronic nonproductive cough, denies fevers or chills     CXR 8/27: Asymmetric right basal opacity, favor asymmetric chest wall tissue, however may also represent layering pleural effusion  · Hold home, Stiolto   · Continue duonebs RTC  · Reduce Solumedrol from 40 mg IV q 8h to q 12h  · No antibiotics at this time  · Respiratory protocol  · I S   · Supplemental oxygen PRN  · Patient will need a nebulizer on discharge

## 2020-08-28 NOTE — PLAN OF CARE
Problem: INFECTION - ADULT  Goal: Absence or prevention of progression during hospitalization  Description: INTERVENTIONS:  - Assess and monitor for signs and symptoms of infection  - Monitor lab/diagnostic results  - Monitor all insertion sites, i e  indwelling lines, tubes, and drains  - Monitor endotracheal if appropriate and nasal secretions for changes in amount and color  - [place] appropriate cooling/warming therapies per order  - Administer medications as ordered  - Instruct and encourage patient and family to use good hand hygiene technique  - Identify and instruct in appropriate isolation precautions for identified infection/condition  Outcome: Progressing     Problem: SAFETY ADULT  Goal: Maintain or return to baseline ADL function  Description: INTERVENTIONS:  -  Assess patient's ability to carry out ADLs; assess patient's baseline for ADL function and identify physical deficits which impact ability to perform ADLs (bathing, care of mouth/teeth, toileting, grooming, dressing, etc )  - Assess/evaluate cause of self-care deficits   - Assess range of motion  - Assess patient's mobility; develop plan if impaired  - Assess patient's need for assistive devices and provide as appropriate  - Encourage maximum independence but intervene and supervise when necessary  - Involve family in performance of ADLs  - Assess for home care needs following discharge   - Consider OT consult to assist with ADL evaluation and planning for discharge  - Provide patient education as appropriate  Outcome: Progressing  Goal: Maintain or return mobility status to optimal level  Description: INTERVENTIONS:  - Assess patient's baseline mobility status (ambulation, transfers, stairs, etc )    - Identify cognitive and physical deficits and behaviors that affect mobility  - Identify mobility aids required to assist with transfers and/or ambulation (gait belt, sit-to-stand, lift, walker, cane, etc )  - [place] fall precautions as indicated by assessment  - Record patient progress and toleration of activity level on Mobility SBAR; progress patient to next Phase/Stage  - Instruct patient to call for assistance with activity based on assessment  - Consider rehabilitation consult to assist with strengthening/weightbearing, etc   Outcome: Progressing     Problem: DISCHARGE PLANNING  Goal: Discharge to home or other facility with appropriate resources  Description: INTERVENTIONS:  - Identify barriers to discharge w/patient and caregiver  - Arrange for needed discharge resources and transportation as appropriate  - Identify discharge learning needs (meds, wound care, etc )  support system  Outcome: Progressing     Problem: Knowledge Deficit  Goal: Patient/family/caregiver demonstrates understanding of disease process, treatment plan, medications, and discharge instructions  Description: Complete learning assessment and assess knowledge base    Interventions:  - Provide teaching at level of understanding  - Provide teaching via preferred learning methods  Outcome: Progressing

## 2020-08-28 NOTE — PLAN OF CARE
Problem: INFECTION - ADULT  Goal: Absence or prevention of progression during hospitalization  Description: INTERVENTIONS:  - Assess and monitor for signs and symptoms of infection  - Monitor lab/diagnostic results  - Monitor all insertion sites, i e  indwelling lines, tubes, and drains  - Monitor endotracheal if appropriate and nasal secretions for changes in amount and color  - [place] appropriate cooling/warming therapies per order  - Administer medications as ordered  - Instruct and encourage patient and family to use good hand hygiene technique  - Identify and instruct in appropriate isolation precautions for identified infection/condition  8/27/2020 2202 by Tierney Dominique RN  Outcome: Progressing  8/27/2020 2153 by Tierney Dominique RN  Outcome: Progressing     Problem: SAFETY ADULT  Goal: Maintain or return to baseline ADL function  Description: INTERVENTIONS:  -  Assess patient's ability to carry out ADLs; assess patient's baseline for ADL function and identify physical deficits which impact ability to perform ADLs (bathing, care of mouth/teeth, toileting, grooming, dressing, etc )  - Assess/evaluate cause of self-care deficits   - Assess range of motion  - Assess patient's mobility; develop plan if impaired  - Assess patient's need for assistive devices and provide as appropriate  - Encourage maximum independence but intervene and supervise when necessary  - Involve family in performance of ADLs  - Assess for home care needs following discharge   - Consider OT consult to assist with ADL evaluation and planning for discharge  - Provide patient education as appropriate  8/27/2020 2202 by Tierney Dominique RN  Outcome: Progressing  8/27/2020 2153 by Tierney Dominique RN  Outcome: Progressing  Goal: Maintain or return mobility status to optimal level  Description: INTERVENTIONS:  - Assess patient's baseline mobility status (ambulation, transfers, stairs, etc )    - Identify cognitive and physical deficits and behaviors that affect mobility  - Identify mobility aids required to assist with transfers and/or ambulation (gait belt, sit-to-stand, lift, walker, cane, etc )  - [place] fall precautions as indicated by assessment  - Record patient progress and toleration of activity level on Mobility SBAR; progress patient to next Phase/Stage  - Instruct patient to call for assistance with activity based on assessment  - Consider rehabilitation consult to assist with strengthening/weightbearing, etc   8/27/2020 2202 by Ashlyn Hair RN  Outcome: Progressing  8/27/2020 2153 by Ashlyn Hair RN  Outcome: Progressing     Problem: DISCHARGE PLANNING  Goal: Discharge to home or other facility with appropriate resources  Description: INTERVENTIONS:  - Identify barriers to discharge w/patient and caregiver  - Arrange for needed discharge resources and transportation as appropriate  - Identify discharge learning needs (meds, wound care, etc )  support system  8/27/2020 2202 by Ashlyn Hair RN  Outcome: Progressing  8/27/2020 2153 by Ashlyn Hair RN  Outcome: Progressing     Problem: Knowledge Deficit  Goal: Patient/family/caregiver demonstrates understanding of disease process, treatment plan, medications, and discharge instructions  Description: Complete learning assessment and assess knowledge base    Interventions:  - Provide teaching at level of understanding  - Provide teaching via preferred learning methods  8/27/2020 2202 by Ashlyn Hair RN  Outcome: Progressing  8/27/2020 2153 by Ashlyn Hair RN  Outcome: Progressing     Problem: RESPIRATORY - ADULT  Goal: Achieves optimal ventilation and oxygenation  Description: INTERVENTIONS:  - Assess for changes in respiratory status  - Assess for changes in mentation and behavior  - Position to facilitate oxygenation and minimize respiratory effort  - Oxygen administered by appropriate delivery if ordered  - Initiate smoking cessation education as indicated  - Encourage broncho-pulmonary hygiene including cough, deep breathe, Incentive Spirometry  - Assess the need for suctioning and aspirate as needed  - Assess and instruct to report SOB or any respiratory difficulty  - Respiratory Therapy support as indicated  Outcome: Progressing  Goal: Achieves optimal ventilation and oxygenation  Description: INTERVENTIONS:  Outcome: Progressing

## 2020-08-29 VITALS
HEART RATE: 80 BPM | OXYGEN SATURATION: 89 % | SYSTOLIC BLOOD PRESSURE: 122 MMHG | BODY MASS INDEX: 37.25 KG/M2 | RESPIRATION RATE: 18 BRPM | HEIGHT: 73 IN | TEMPERATURE: 97.2 F | DIASTOLIC BLOOD PRESSURE: 74 MMHG | WEIGHT: 281.09 LBS

## 2020-08-29 PROBLEM — J96.01 ACUTE RESPIRATORY FAILURE WITH HYPOXIA (HCC): Status: RESOLVED | Noted: 2020-08-27 | Resolved: 2020-08-29

## 2020-08-29 LAB
ALBUMIN SERPL BCP-MCNC: 3.2 G/DL (ref 3.5–5)
ALP SERPL-CCNC: 47 U/L (ref 46–116)
ALT SERPL W P-5'-P-CCNC: 29 U/L (ref 12–78)
ANION GAP SERPL CALCULATED.3IONS-SCNC: 7 MMOL/L (ref 4–13)
AST SERPL W P-5'-P-CCNC: 13 U/L (ref 5–45)
ATRIAL RATE: 108 BPM
ATRIAL RATE: 97 BPM
BASOPHILS # BLD AUTO: 0.03 THOUSANDS/ΜL (ref 0–0.1)
BASOPHILS NFR BLD AUTO: 0 % (ref 0–1)
BILIRUB SERPL-MCNC: 0.4 MG/DL (ref 0.2–1)
BUN SERPL-MCNC: 20 MG/DL (ref 5–25)
CALCIUM SERPL-MCNC: 8.3 MG/DL (ref 8.3–10.1)
CHLORIDE SERPL-SCNC: 103 MMOL/L (ref 100–108)
CO2 SERPL-SCNC: 26 MMOL/L (ref 21–32)
CREAT SERPL-MCNC: 0.89 MG/DL (ref 0.6–1.3)
EOSINOPHIL # BLD AUTO: 0.01 THOUSAND/ΜL (ref 0–0.61)
EOSINOPHIL NFR BLD AUTO: 0 % (ref 0–6)
ERYTHROCYTE [DISTWIDTH] IN BLOOD BY AUTOMATED COUNT: 12.5 % (ref 11.6–15.1)
GFR SERPL CREATININE-BSD FRML MDRD: 95 ML/MIN/1.73SQ M
GLUCOSE SERPL-MCNC: 135 MG/DL (ref 65–140)
HCT VFR BLD AUTO: 44.8 % (ref 36.5–49.3)
HGB BLD-MCNC: 14.3 G/DL (ref 12–17)
IMM GRANULOCYTES # BLD AUTO: 0.08 THOUSAND/UL (ref 0–0.2)
IMM GRANULOCYTES NFR BLD AUTO: 1 % (ref 0–2)
LYMPHOCYTES # BLD AUTO: 1.91 THOUSANDS/ΜL (ref 0.6–4.47)
LYMPHOCYTES NFR BLD AUTO: 13 % (ref 14–44)
MCH RBC QN AUTO: 31.2 PG (ref 26.8–34.3)
MCHC RBC AUTO-ENTMCNC: 31.9 G/DL (ref 31.4–37.4)
MCV RBC AUTO: 98 FL (ref 82–98)
MONOCYTES # BLD AUTO: 0.65 THOUSAND/ΜL (ref 0.17–1.22)
MONOCYTES NFR BLD AUTO: 5 % (ref 4–12)
NEUTROPHILS # BLD AUTO: 11.62 THOUSANDS/ΜL (ref 1.85–7.62)
NEUTS SEG NFR BLD AUTO: 81 % (ref 43–75)
NRBC BLD AUTO-RTO: 0 /100 WBCS
P AXIS: -20 DEGREES
P AXIS: 40 DEGREES
PLATELET # BLD AUTO: 221 THOUSANDS/UL (ref 149–390)
PMV BLD AUTO: 9.3 FL (ref 8.9–12.7)
POTASSIUM SERPL-SCNC: 4.8 MMOL/L (ref 3.5–5.3)
PR INTERVAL: 166 MS
PR INTERVAL: 174 MS
PROCALCITONIN SERPL-MCNC: <0.05 NG/ML
PROT SERPL-MCNC: 6.7 G/DL (ref 6.4–8.2)
QRS AXIS: 50 DEGREES
QRS AXIS: 54 DEGREES
QRSD INTERVAL: 106 MS
QRSD INTERVAL: 110 MS
QT INTERVAL: 338 MS
QT INTERVAL: 358 MS
QTC INTERVAL: 452 MS
QTC INTERVAL: 454 MS
RBC # BLD AUTO: 4.59 MILLION/UL (ref 3.88–5.62)
SODIUM SERPL-SCNC: 136 MMOL/L (ref 136–145)
T WAVE AXIS: 58 DEGREES
T WAVE AXIS: 59 DEGREES
VENTRICULAR RATE: 108 BPM
VENTRICULAR RATE: 97 BPM
WBC # BLD AUTO: 14.3 THOUSAND/UL (ref 4.31–10.16)

## 2020-08-29 PROCEDURE — 85025 COMPLETE CBC W/AUTO DIFF WBC: CPT | Performed by: NURSE PRACTITIONER

## 2020-08-29 PROCEDURE — 80053 COMPREHEN METABOLIC PANEL: CPT | Performed by: NURSE PRACTITIONER

## 2020-08-29 PROCEDURE — 99239 HOSP IP/OBS DSCHRG MGMT >30: CPT | Performed by: INTERNAL MEDICINE

## 2020-08-29 PROCEDURE — 93010 ELECTROCARDIOGRAM REPORT: CPT | Performed by: INTERNAL MEDICINE

## 2020-08-29 PROCEDURE — 94640 AIRWAY INHALATION TREATMENT: CPT

## 2020-08-29 PROCEDURE — 94761 N-INVAS EAR/PLS OXIMETRY MLT: CPT

## 2020-08-29 PROCEDURE — 84145 PROCALCITONIN (PCT): CPT | Performed by: NURSE PRACTITIONER

## 2020-08-29 PROCEDURE — 94760 N-INVAS EAR/PLS OXIMETRY 1: CPT

## 2020-08-29 RX ORDER — LEVALBUTEROL INHALATION SOLUTION 0.63 MG/3ML
0.63 SOLUTION RESPIRATORY (INHALATION)
Qty: 360 ML | Refills: 0 | Status: SHIPPED | OUTPATIENT
Start: 2020-08-29 | End: 2020-09-28

## 2020-08-29 RX ORDER — GUAIFENESIN 600 MG
600 TABLET, EXTENDED RELEASE 12 HR ORAL EVERY 12 HOURS SCHEDULED
Qty: 10 TABLET | Refills: 0 | Status: SHIPPED | OUTPATIENT
Start: 2020-08-29 | End: 2020-09-03

## 2020-08-29 RX ORDER — PREDNISONE 10 MG/1
TABLET ORAL
Qty: 30 TABLET | Refills: 0 | Status: SHIPPED | OUTPATIENT
Start: 2020-08-29

## 2020-08-29 RX ADMIN — IPRATROPIUM BROMIDE 0.5 MG: 0.5 SOLUTION RESPIRATORY (INHALATION) at 11:10

## 2020-08-29 RX ADMIN — ASPIRIN 81 MG 81 MG: 81 TABLET ORAL at 09:14

## 2020-08-29 RX ADMIN — ENOXAPARIN SODIUM 40 MG: 40 INJECTION SUBCUTANEOUS at 09:15

## 2020-08-29 RX ADMIN — LEVALBUTEROL HYDROCHLORIDE 0.63 MG: 0.63 SOLUTION RESPIRATORY (INHALATION) at 07:39

## 2020-08-29 RX ADMIN — METHYLPREDNISOLONE SODIUM SUCCINATE 40 MG: 40 INJECTION, POWDER, FOR SOLUTION INTRAMUSCULAR; INTRAVENOUS at 09:15

## 2020-08-29 RX ADMIN — GUAIFENESIN 600 MG: 600 TABLET, EXTENDED RELEASE ORAL at 09:15

## 2020-08-29 RX ADMIN — LEVALBUTEROL HYDROCHLORIDE 0.63 MG: 0.63 SOLUTION RESPIRATORY (INHALATION) at 11:10

## 2020-08-29 RX ADMIN — ATORVASTATIN CALCIUM 10 MG: 10 TABLET, FILM COATED ORAL at 09:15

## 2020-08-29 RX ADMIN — LORATADINE 10 MG: 10 TABLET ORAL at 09:15

## 2020-08-29 RX ADMIN — IPRATROPIUM BROMIDE 0.5 MG: 0.5 SOLUTION RESPIRATORY (INHALATION) at 07:39

## 2020-08-29 RX ADMIN — FLUTICASONE PROPIONATE 2 SPRAY: 50 SPRAY, METERED NASAL at 09:15

## 2020-08-29 NOTE — ASSESSMENT & PLAN NOTE
Presented to the emergency department with worsening of shortness of breath and cough productive of greenish white sputum  Patient reported symptoms over last 2 weeks but worsened over last few days  History of COPD  He has a chronic nonproductive cough, denies fevers or chills  CXR 8/27: Asymmetric right basal opacity, favor asymmetric chest wall tissue, however may also represent layering pleural effusion  ABG 7 41/37/66/91% on 4 L at the time of admission  CBC CMP-normal   Troponin, proBNP, D-dimer, procalcitonin, lactic acid within normal limit  EKG without any acute abnormality  Blood cultures negative  SARS-CoV-2 PCR negative  Treated with bronchodilators, IV steroids with improvement in symptoms and resolution of hypoxia  Feels much better  Cough is improving  Shortness of breath a significantly improved  Denies any chest pain  Episode of sinus tachycardia on albuterol, now resolved after changing to Xopenex  Ambulating in hallway without any limiting symptoms and saturating in mid 90s room air  · Will discharge home on Xopenex(discussed regarding potential of not being covered by insurance but patient wants to try)    Patient was given nebulizer  · Started prednisone taper starting with 40 mg daily and decrease by 10 mg every 3 days  · Continue Stiolto  · Recommended to follow-up with Pulmonary after discharge  · Follow-up chest x-ray will follow-up with Pulmonary / PCP

## 2020-08-29 NOTE — NURSING NOTE
Patient discharged to home  Discharge instructions and AVS reviewed  Appointments to be made and medication details reviewed  Opportunity for question provided  We reviewed COPD education  Pt ambulated to [facility] with daughter present for ride home

## 2020-08-29 NOTE — ASSESSMENT & PLAN NOTE
Secondary to COPD exacerbation  Improved, saturating in mid to high 90s at rest and with activity  Does not require supplemental oxygen  · Plan per above

## 2020-08-29 NOTE — PLAN OF CARE
Problem: INFECTION - ADULT  Goal: Absence or prevention of progression during hospitalization  Description: INTERVENTIONS:  - Assess and monitor for signs and symptoms of infection  - Monitor lab/diagnostic results  - Monitor all insertion sites, i e  indwelling lines, tubes, and drains  - Monitor endotracheal if appropriate and nasal secretions for changes in amount and color  - [place] appropriate cooling/warming therapies per order  - Administer medications as ordered  - Instruct and encourage patient and family to use good hand hygiene technique  - Identify and instruct in appropriate isolation precautions for identified infection/condition  Outcome: Progressing     Problem: SAFETY ADULT  Goal: Maintain or return to baseline ADL function  Description: INTERVENTIONS:  -  Assess patient's ability to carry out ADLs; assess patient's baseline for ADL function and identify physical deficits which impact ability to perform ADLs (bathing, care of mouth/teeth, toileting, grooming, dressing, etc )  - Assess/evaluate cause of self-care deficits   - Assess range of motion  - Assess patient's mobility; develop plan if impaired  - Assess patient's need for assistive devices and provide as appropriate  - Encourage maximum independence but intervene and supervise when necessary  - Involve family in performance of ADLs  - Assess for home care needs following discharge   - Consider OT consult to assist with ADL evaluation and planning for discharge  - Provide patient education as appropriate  Outcome: Progressing  Goal: Maintain or return mobility status to optimal level  Description: INTERVENTIONS:  - Assess patient's baseline mobility status (ambulation, transfers, stairs, etc )    - Identify cognitive and physical deficits and behaviors that affect mobility  - Identify mobility aids required to assist with transfers and/or ambulation (gait belt, sit-to-stand, lift, walker, cane, etc )  - [place] fall precautions as indicated by assessment  - Record patient progress and toleration of activity level on Mobility SBAR; progress patient to next Phase/Stage  - Instruct patient to call for assistance with activity based on assessment  - Consider rehabilitation consult to assist with strengthening/weightbearing, etc   Outcome: Progressing     Problem: DISCHARGE PLANNING  Goal: Discharge to home or other facility with appropriate resources  Description: INTERVENTIONS:  - Identify barriers to discharge w/patient and caregiver  - Arrange for needed discharge resources and transportation as appropriate  - Identify discharge learning needs (meds, wound care, etc )  support system  Outcome: Progressing     Problem: Knowledge Deficit  Goal: Patient/family/caregiver demonstrates understanding of disease process, treatment plan, medications, and discharge instructions  Description: Complete learning assessment and assess knowledge base    Interventions:  - Provide teaching at level of understanding  - Provide teaching via preferred learning methods  Outcome: Progressing     Problem: RESPIRATORY - ADULT  Goal: Achieves optimal ventilation and oxygenation  Description: INTERVENTIONS:  - Assess for changes in respiratory status  - Assess for changes in mentation and behavior  - Position to facilitate oxygenation and minimize respiratory effort  - Oxygen administered by appropriate delivery if ordered  - Initiate smoking cessation education as indicated  - Encourage broncho-pulmonary hygiene including cough, deep breathe, Incentive Spirometry  - Assess the need for suctioning and aspirate as needed  - Assess and instruct to report SOB or any respiratory difficulty  - Respiratory Therapy support as indicated  Outcome: Progressing  Goal: Achieves optimal ventilation and oxygenation  Description: INTERVENTIONS:  Outcome: Progressing

## 2020-08-29 NOTE — PLAN OF CARE
Problem: INFECTION - ADULT  Goal: Absence or prevention of progression during hospitalization  Description: INTERVENTIONS:  - Assess and monitor for signs and symptoms of infection  - Monitor lab/diagnostic results  - Monitor all insertion sites, i e  indwelling lines, tubes, and drains  - Monitor endotracheal if appropriate and nasal secretions for changes in amount and color  - [place] appropriate cooling/warming therapies per order  - Administer medications as ordered  - Instruct and encourage patient and family to use good hand hygiene technique  - Identify and instruct in appropriate isolation precautions for identified infection/condition  Outcome: Adequate for Discharge     Problem: SAFETY ADULT  Goal: Maintain or return to baseline ADL function  Description: INTERVENTIONS:  -  Assess patient's ability to carry out ADLs; assess patient's baseline for ADL function and identify physical deficits which impact ability to perform ADLs (bathing, care of mouth/teeth, toileting, grooming, dressing, etc )  - Assess/evaluate cause of self-care deficits   - Assess range of motion  - Assess patient's mobility; develop plan if impaired  - Assess patient's need for assistive devices and provide as appropriate  - Encourage maximum independence but intervene and supervise when necessary  - Involve family in performance of ADLs  - Assess for home care needs following discharge   - Consider OT consult to assist with ADL evaluation and planning for discharge  - Provide patient education as appropriate  Outcome: Adequate for Discharge  Goal: Maintain or return mobility status to optimal level  Description: INTERVENTIONS:  - Assess patient's baseline mobility status (ambulation, transfers, stairs, etc )    - Identify cognitive and physical deficits and behaviors that affect mobility  - Identify mobility aids required to assist with transfers and/or ambulation (gait belt, sit-to-stand, lift, walker, cane, etc )  - [place] fall precautions as indicated by assessment  - Record patient progress and toleration of activity level on Mobility SBAR; progress patient to next Phase/Stage  - Instruct patient to call for assistance with activity based on assessment  - Consider rehabilitation consult to assist with strengthening/weightbearing, etc   Outcome: Adequate for Discharge     Problem: DISCHARGE PLANNING  Goal: Discharge to home or other facility with appropriate resources  Description: INTERVENTIONS:  - Identify barriers to discharge w/patient and caregiver  - Arrange for needed discharge resources and transportation as appropriate  - Identify discharge learning needs (meds, wound care, etc )  support system  Outcome: Adequate for Discharge     Problem: Knowledge Deficit  Goal: Patient/family/caregiver demonstrates understanding of disease process, treatment plan, medications, and discharge instructions  Description: Complete learning assessment and assess knowledge base    Interventions:  - Provide teaching at level of understanding  - Provide teaching via preferred learning methods  Outcome: Adequate for Discharge     Problem: RESPIRATORY - ADULT  Goal: Achieves optimal ventilation and oxygenation  Description: INTERVENTIONS:  - Assess for changes in respiratory status  - Assess for changes in mentation and behavior  - Position to facilitate oxygenation and minimize respiratory effort  - Oxygen administered by appropriate delivery if ordered  - Initiate smoking cessation education as indicated  - Encourage broncho-pulmonary hygiene including cough, deep breathe, Incentive Spirometry  - Assess the need for suctioning and aspirate as needed  - Assess and instruct to report SOB or any respiratory difficulty  - Respiratory Therapy support as indicated  Outcome: Adequate for Discharge  Goal: Achieves optimal ventilation and oxygenation  Description: INTERVENTIONS:  Outcome: Adequate for Discharge

## 2020-08-29 NOTE — INCIDENTAL FINDINGS
The following findings require follow up:  Radiographic finding   Finding:  Concern of opacity of the right basal of the lung likely artifactual    Follow up required:  Yes   Follow up should be done within 2 week(s) with repeat chest x-ray    Please notify the following clinician to assist with the follow up:   Dr Sera Villarreal PA-C or pulmonologist

## 2020-08-29 NOTE — DISCHARGE SUMMARY
Discharge Summary - [facility] Internal Medicine    Patient Information: Jeremy Ramírez 60 y o  male MRN: [id number]  Unit/Bed#: [address] [address] Encounter: [id number]    Discharging Physician / Practitioner: Paulina Matthews MD  PCP: Felix Power PA-C  Admission Date: 8/27/2020  Discharge Date: 08/29/20    Reason for Admission: Shortness of Breath (Patient with hx of COPD, quit smoking May, on CPAP at [facility] no pulmonologist  For past 3-4 days having a hard time breathing and his inhaler is not working  Coughing up white to green sputum last 2 weeks  Sat 88-91 % with severe MIRANDA)      Discharge Diagnoses:     Principal Problem:    COPD exacerbation ([address] )  Active Problems:    Sleep apnea    Hyperlipidemia  Resolved Problems:    Acute respiratory failure with hypoxia (HCC)        * COPD exacerbation (HCC)  Assessment & Plan  Presented to the emergency department with worsening of shortness of breath and cough productive of greenish white sputum  Patient reported symptoms over last 2 weeks but worsened over last few days  History of COPD  He has a chronic nonproductive cough, denies fevers or chills  CXR 8/27: Asymmetric right basal opacity, favor asymmetric chest wall tissue, however may also represent layering pleural effusion  ABG 7 41/37/66/91% on 4 L at the time of admission  CBC CMP-normal   Troponin, proBNP, D-dimer, procalcitonin, lactic acid within normal limit  EKG without any acute abnormality  Blood cultures negative  SARS-CoV-2 PCR negative  Treated with bronchodilators, IV steroids with improvement in symptoms and resolution of hypoxia  Feels much better  Cough is improving  Shortness of breath a significantly improved  Denies any chest pain  Episode of sinus tachycardia on albuterol, now resolved after changing to Xopenex    Ambulating in hallway without any limiting symptoms and saturating in mid 90s room air  · Will discharge home on Xopenex(discussed regarding potential of not being covered by insurance but patient wants to try)  Patient was given nebulizer  · Started prednisone taper starting with 40 mg daily and decrease by 10 mg every 3 days  · Continue Stiolto  · Recommended to follow-up with Pulmonary after discharge  · Follow-up chest x-ray will follow-up with Pulmonary / PCP    Acute respiratory failure with hypoxia (HCC)resolved as of 8/29/2020  Assessment & Plan  Secondary to COPD exacerbation  Improved, saturating in mid to high 90s at rest and with activity  Does not require supplemental oxygen  · Plan per above    Hyperlipidemia  Assessment & Plan  Continue statin    Sleep apnea  Assessment & Plan  Continue CPAP at 13 per home routine      Consultations During Hospital Stay:  None    Procedures Performed:     · None    Significant Findings:     · Refer to hospital course and above listed diagnosis related plan for details    Imaging while in hospital:    Xr Chest 1 View Portable    Result Date: 8/28/2020  Narrative: CHEST INDICATION:   Short of breath  COMPARISON:  CT 8/1/17 EXAM PERFORMED/VIEWS:  XR CHEST PORTABLE FINDINGS: Cardiomediastinal silhouette appears unremarkable  Asymmetric opacity in the right lung base  No pneumothorax  No large pleural effusion  Osseous structures appear within normal limits for patient age  Impression: Asymmetric right basal opacity, favor asymmetric chest wall tissue, however may also represent layering pleural effusion The study was marked in EPIC for immediate notification   Workstation performed: [id number]       Incidental Findings:   · X-ray results as above    Test Results Pending at Discharge (will require follow up):   · As per After Visit Summary     Outpatient Tests Requested:  · PFT  · Repeat chest x-ray    Complications:  Refer to hospital course and above listed diagnosis related plan, if any    Hospital Course:     Hugo Hussein is a 60 y o  male patient with history of COPD, DYLAN, dyslipidemia who originally presented to the hospital on 8/27/2020 due to worsening of shortness of breath and cough productive of white is green sputum over last 2 weeks  Patient denied any fever, chills, URI symptoms or chest pain  Symptoms worsened over last few days despite taking his rescue inhaler  In ED, patient was noted to be afebrile, tachypneic  Saturating in low 90s on 4 L of supplemental oxygen will presentation  Chest x-ray revealed no acute abnormality  EKG cardiac markers proBNP D-dimer and procalcitonin within normal limit  Patient was subsequently admitted to for evaluation treatment  Please see above list of diagnoses and related plan for additional information  Condition at Discharge: stable     Discharge Day Visit / Exam:     Subjective:  Feels much better  able to sleep better without any shortness of breath and cough  Denies any chest pain or palpitation  Ambulated with patient in the hallway without supplemental oxygen, patient ambulated without any limiting symptoms and saturation remained in mid 90s    Vitals: Blood Pressure: 122/74 (08/29/20 0814)  Pulse: 80 (08/29/20 0814)  Temperature: (!) 97 2 °F (36 2 °C) (08/29/20 0814)  Temp Source: Oral (08/28/20 0756)  Respirations: 18 (08/29/20 0814)  Height: 6' 1" (185 4 cm) (08/27/20 2049)  Weight - Scale: 127 kg (281 lb 1 4 oz) (08/27/20 2049)  SpO2: 92 % (08/29/20 0814) on room air  Exam:   Physical Exam  Constitutional:       General: He is not in acute distress  Appearance: He is obese  HENT:      Head: Atraumatic  Eyes:      Pupils: Pupils are equal, round, and reactive to light  Neck:      Musculoskeletal: Normal range of motion and neck supple  Cardiovascular:      Rate and Rhythm: Normal rate and regular rhythm  Heart sounds: Normal heart sounds  Pulmonary:      Effort: Pulmonary effort is normal  No tachypnea, accessory muscle usage or respiratory distress  Breath sounds: No rhonchi or rales        Comments: Diminished, few scattered wheezing  No conversational dyspnea  Chest:      Chest wall: No tenderness  Abdominal:      General: Bowel sounds are normal  There is no distension  Palpations: Abdomen is soft  Tenderness: There is no abdominal tenderness  There is no guarding or rebound  Musculoskeletal:         General: No swelling  Skin:     General: Skin is warm and dry  Findings: No rash  Neurological:      Mental Status: He is alert  Cranial Nerves: No cranial nerve deficit  Discharge instructions/Information to patient and family:(Discharge Medications and Follow up):   See after visit summary for information provided to patient and family  Provisions for Follow-Up Care:  See after visit summary for information related to follow-up care and any pertinent home health orders  Disposition: Home    Planned Readmission:  No     Discharge Statement:  I spent 45 minutes discharging the patient  This time was spent on the day of discharge  I had direct contact with the patient on the day of discharge  Greater than 50% of the total time was spent examining patient, answering all patient questions, arranging and discussing plan of care with patient as well as directly providing post-discharge instructions  Additional time then spent on discharge activities  Discharge Medications:  See after visit summary for reconciled discharge medications provided to patient and family  ** Please Note: "This note has been constructed using a voice recognition system  Therefore there may be syntax, spelling, and/or grammatical errors   Please call if you have any questions  "**

## 2020-08-31 NOTE — UTILIZATION REVIEW
Notification of Discharge  This is a Notification of Discharge from our facility [address]  Please be advised that this patient has been discharge from our facility  Below you will find the admission and discharge date and time including the patients disposition  PRESENTATION DATE: 8/27/2020  4:58 PM  OBS ADMISSION DATE:   IP ADMISSION DATE: 8/27/20 1924   DISCHARGE DATE: 8/29/2020  1:02 PM  DISPOSITION: Home/Self Care Home/Self Care   Admission Orders listed below:  Admission Orders (From admission, onward)     Ordered        08/27/20 1924  Inpatient Admission  Once                   Please contact the UR Department if additional information is required to close this patient's authorization/case  Kinjal Wright  [facility] Utilization Review Department  Main: [phone] x carefully listen to the prompts  All voicemails are confidential   [email or web address]  Send all requests for admission clinical reviews, approved or denied determinations and any other requests to dedicated fax number below belonging to the campus where the patient is receiving treatment   List of dedicated fax numbers:  [address] DENIALS (Administrative/Medical Necessity) [phone]   [address] (Maternity/NICU/Pediatrics) [phone]   Leah Cosby [phone]   Kayla Boyd [phone]   Delmis Carrillo [phone]   [facility] [address] [place] [address] [phone]   [facility]  [phone]   [address] [facility] [address]  [address] [facility] [address] [facility] [phone]

## 2020-09-01 LAB
BACTERIA BLD CULT: NORMAL
BACTERIA BLD CULT: NORMAL

## 2022-08-03 ENCOUNTER — OFFICE VISIT (OUTPATIENT)
Dept: URGENT CARE | Facility: CLINIC | Age: 57
End: 2022-08-03
Payer: COMMERCIAL

## 2022-08-03 VITALS
DIASTOLIC BLOOD PRESSURE: 90 MMHG | OXYGEN SATURATION: 97 % | SYSTOLIC BLOOD PRESSURE: 140 MMHG | HEIGHT: 73 IN | HEART RATE: 97 BPM | WEIGHT: 312 LBS | BODY MASS INDEX: 41.35 KG/M2 | RESPIRATION RATE: 18 BRPM | TEMPERATURE: 98 F

## 2022-08-03 DIAGNOSIS — L72.9 INFECTED CYST OF SKIN: Primary | ICD-10-CM

## 2022-08-03 DIAGNOSIS — L08.9 INFECTED CYST OF SKIN: Primary | ICD-10-CM

## 2022-08-03 PROCEDURE — 99203 OFFICE O/P NEW LOW 30 MIN: CPT | Performed by: FAMILY MEDICINE

## 2022-08-03 RX ORDER — ALBUTEROL SULFATE 2.5 MG/3ML
2.5 SOLUTION RESPIRATORY (INHALATION) EVERY 6 HOURS PRN
COMMUNITY

## 2022-08-03 RX ORDER — AMOXICILLIN AND CLAVULANATE POTASSIUM 875; 125 MG/1; MG/1
1 TABLET, FILM COATED ORAL EVERY 12 HOURS SCHEDULED
Qty: 6 TABLET | Refills: 0 | Status: SHIPPED | OUTPATIENT
Start: 2022-08-03 | End: 2022-08-06

## 2022-08-03 NOTE — PROGRESS NOTES
[address] [facility]        NAME: Ag Bueno is a 59 y o  male  : 1965    MRN: [id number]  DATE: August 3, 2022  TIME: 3:52 PM    Assessment and Plan   Infected cyst of skin [L72 9, L08 9]  1  Infected cyst of skin  amoxicillin-clavulanate (AUGMENTIN) 875-125 mg per tablet     Ganglion cyst equivalent of the left elbow which has become inflamed  However the inflammation does not appear to affect the joint space as there is no pain or stiffness with ROM  Will treat with 3 days of Augmentin for possibility of bacterial infection  Patient instructed to ice the left elbow 2-3 times daily for relief  Patient Instructions     Follow up with PCP in 3-5 days  Proceed to  ER if symptoms worsen  Chief Complaint     Chief Complaint   Patient presents with    Joint Swelling     Left elbow pain, 2 days Went to lean on it and noticed its swollen  Aleve         History of Present Illness     57-year-old male presents today due to 2 days of left elbow tenderness and swelling without any obvious trauma  Two days ago he recalls placing his elbow on the table resulting in sharp pain  Initially noticed a silver dollar-sized lump adjacent to the left elbow  Eventually grew in size becoming less defined with mild redness  Denies any left elbow pain or stiffness, but experiences tenderness  Review of Systems   Review of Systems   Constitutional: Negative for chills and fever  HENT: Negative for congestion, rhinorrhea and sore throat  Respiratory: Negative for cough, shortness of breath and wheezing  Cardiovascular: Negative for chest pain  Gastrointestinal: Negative for abdominal pain and nausea  Musculoskeletal: Positive for arthralgias and joint swelling  Skin: Positive for color change  Negative for rash  Neurological: Negative for dizziness and headaches       Current Medications       Current Outpatient Medications:     Albuterol Sulfate (albuterol, FOR EMS ONLY,) (2 5 mg/3 mL) 0 083 % nebulizer solution, Take 2 5 mg by nebulization every 6 (six) hours as needed for wheezing, Disp: , Rfl:     amoxicillin-clavulanate (AUGMENTIN) 875-125 mg per tablet, Take 1 tablet by mouth every 12 (twelve) hours for 3 days, Disp: 6 tablet, Rfl: 0    ASPIRIN 81 PO, Take 81 mg by mouth daily, Disp: , Rfl:     fluticasone-umeclidinium-vilanterol (Trelegy Ellipta) 100-62 5-25 MCG/INH inhaler, Inhale 1 puff daily, Disp: , Rfl:     loratadine (CLARITIN) 10 mg tablet, TAKE ONE TABLET BY MOUTH EVERY DAY (Bienvenido Cannelton), Disp: , Rfl:     tiotropium-olodaterol (Stiolto Respimat) 2 5-2 5 MCG/ACT inhaler, Inhale 2 puffs daily, Disp: , Rfl:     atorvastatin (LIPITOR) 10 mg tablet, Take 10 mg by mouth daily, Disp: , Rfl:     fluticasone (FLONASE) 50 mcg/act nasal spray, 2 sprays into each nostril daily (Patient not taking: Reported on 8/3/2022), Disp: , Rfl:     predniSONE 10 mg tablet, Take 40 mg/day for 3 days then 30mg/day for 3 days then 20mg/day for 3 days then 10mg/day for 3 days (Patient not taking: Reported on 8/3/2022), Disp: 30 tablet, Rfl: 0    Current Allergies     Allergies as of 08/03/2022    (No Known Allergies)            The following portions of the patient's history were reviewed and updated as appropriate: allergies, current medications, past family history, past medical history, past social history, past surgical history and problem list      Past Medical History:   Diagnosis Date    COPD (chronic obstructive pulmonary disease) ([facility] [address] )     History of continuous positive airway pressure (CPAP) therapy at home     Hx of seasonal allergies     Sleep apnea        Past Surgical History:   Procedure Laterality Date    APPENDECTOMY         Family History   Problem Relation Age of Onset    Breast cancer Mother     Kidney failure Mother     Diabetes Maternal Grandmother          Medications have been verified          Objective   /90   Pulse 97   Temp 98 °F (36 7 °C)   Resp 18   Ht 6' 1" (1 854 m)   Wt (!) 142 kg (312 lb)   SpO2 97%   BMI 41 16 kg/m²   No LMP for male patient  Physical Exam     Physical Exam  Vitals and nursing note reviewed  Constitutional:       General: He is not in acute distress  Appearance: Normal appearance  He is obese  He is not ill-appearing, toxic-appearing or diaphoretic  HENT:      Head: Normocephalic and atraumatic  Eyes:      General:         Right eye: No discharge  Left eye: No discharge  Conjunctiva/sclera: Conjunctivae normal    Pulmonary:      Effort: Pulmonary effort is normal    Musculoskeletal:         General: Swelling and tenderness present  No deformity  Normal range of motion  Comments: Poorly demarcated, tender swelling over the lateral aspect of the left elbow/proximal forearm  Faint erythema  Skin:     General: Skin is warm  Findings: No erythema  Neurological:      General: No focal deficit present  Mental Status: He is alert and oriented to person, place, and time  Psychiatric:         Mood and Affect: Mood normal          Behavior: Behavior normal          Thought Content:  Thought content normal          Judgment: Judgment normal

## 2022-08-07 ENCOUNTER — HOSPITAL ENCOUNTER (EMERGENCY)
Facility: HOSPITAL | Age: 57
Discharge: HOME | End: 2022-08-07
Attending: EMERGENCY MEDICINE | Admitting: EMERGENCY MEDICINE
Payer: COMMERCIAL

## 2022-08-07 VITALS
SYSTOLIC BLOOD PRESSURE: 157 MMHG | HEART RATE: 90 BPM | TEMPERATURE: 98.4 F | DIASTOLIC BLOOD PRESSURE: 90 MMHG | OXYGEN SATURATION: 94 % | RESPIRATION RATE: 20 BRPM

## 2022-08-07 DIAGNOSIS — L02.414 ABSCESS OF LEFT FOREARM: Primary | ICD-10-CM

## 2022-08-07 PROCEDURE — 87186 SC STD MICRODIL/AGAR DIL: CPT | Performed by: EMERGENCY MEDICINE

## 2022-08-07 PROCEDURE — 99284 EMERGENCY DEPT VISIT MOD MDM: CPT | Performed by: EMERGENCY MEDICINE

## 2022-08-07 PROCEDURE — 99283 EMERGENCY DEPT VISIT LOW MDM: CPT

## 2022-08-07 PROCEDURE — 87070 CULTURE OTHR SPECIMN AEROBIC: CPT | Performed by: EMERGENCY MEDICINE

## 2022-08-07 PROCEDURE — 87205 SMEAR GRAM STAIN: CPT | Performed by: EMERGENCY MEDICINE

## 2022-08-07 RX ORDER — LIDOCAINE HYDROCHLORIDE 10 MG/ML
5 INJECTION, SOLUTION EPIDURAL; INFILTRATION; INTRACAUDAL; PERINEURAL ONCE
Status: COMPLETED | OUTPATIENT
Start: 2022-08-07 | End: 2022-08-07

## 2022-08-07 RX ORDER — CEPHALEXIN 500 MG/1
500 CAPSULE ORAL EVERY 6 HOURS SCHEDULED
Qty: 28 CAPSULE | Refills: 0 | Status: SHIPPED | OUTPATIENT
Start: 2022-08-07 | End: 2022-08-14

## 2022-08-07 RX ORDER — CEPHALEXIN 500 MG/1
500 CAPSULE ORAL ONCE
Status: COMPLETED | OUTPATIENT
Start: 2022-08-07 | End: 2022-08-07

## 2022-08-07 RX ADMIN — CEPHALEXIN 500 MG: 500 CAPSULE ORAL at 17:34

## 2022-08-07 RX ADMIN — LIDOCAINE HYDROCHLORIDE 5 ML: 10 INJECTION, SOLUTION EPIDURAL; INFILTRATION; INTRACAUDAL; PERINEURAL at 17:12

## 2022-08-07 NOTE — DISCHARGE INSTRUCTIONS
Return to the ER for further concerns or worsening symptoms  Follow up with your primary care physician in 1-2 days  Take medication as prescribed  Apply warm compresses to the affected area 3 times daily

## 2022-08-07 NOTE — ED PROVIDER NOTES
History  Chief Complaint   Patient presents with    Elbow Pain     Poss bite/cyst to L elbow  Seen at urgent care few days ago was on antibiotic for a few days  Seemed to become slightly better, bumped it today and it started bleeding  Does not seem to be getting better     Patient in the ED with complaint of possible abscess to left elbow  He was seen at urgent care a few days ago, prescribed Augmentin for 3 days, states that he was compliant with the course  Swelling and redness seemed to improve, however patient developed a blister which accidentally ruptured earlier today  He states that he noticed copious amounts of purulent fluid and blood  History provided by:  Patient   used: No    Elbow Pain  Injury: yes    Pain details:     Quality:  Aching    Severity:  Mild    Onset quality:  Sudden    Timing:  Constant  Handedness:  Right-handed  Dislocation: no    Foreign body present:  No foreign bodies  Tetanus status:  Up to date  Prior injury to area:  No  Relieved by:  Nothing  Worsened by:  Nothing  Ineffective treatments:  None tried  Associated symptoms: no back pain and no fever        Prior to Admission Medications   Prescriptions Last Dose Informant Patient Reported? Taking?    ASPIRIN 81 PO   Yes No   Sig: Take 81 mg by mouth daily   Albuterol Sulfate (albuterol, FOR EMS ONLY,) (2 5 mg/3 mL) 0 083 % nebulizer solution   Yes No   Sig: Take 2 5 mg by nebulization every 6 (six) hours as needed for wheezing   amoxicillin-clavulanate (AUGMENTIN) 875-125 mg per tablet   No No   Sig: Take 1 tablet by mouth every 12 (twelve) hours for 3 days   atorvastatin (LIPITOR) 10 mg tablet   Yes No   Sig: Take 10 mg by mouth daily   fluticasone (FLONASE) 50 mcg/act nasal spray   Yes No   Si sprays into each nostril daily   Patient not taking: Reported on 8/3/2022   fluticasone-umeclidinium-vilanterol (Trelegy Ellipta) 100-62 5-25 MCG/INH inhaler   Yes No   Sig: Inhale 1 puff daily   loratadine (CLARITIN) 10 mg tablet   Yes No   Sig: TAKE ONE TABLET BY MOUTH EVERY DAY (GENERIC CLARITIN)   predniSONE 10 mg tablet   No No   Sig: Take 40 mg/day for 3 days then 30mg/day for 3 days then 20mg/day for 3 days then 10mg/day for 3 days   Patient not taking: Reported on 8/3/2022   tiotropium-olodaterol (Stiolto Respimat) 2 5-2 5 MCG/ACT inhaler   Yes No   Sig: Inhale 2 puffs daily      Facility-Administered Medications: None       Past Medical History:   Diagnosis Date    COPD [facility]chronic obstructive pulmonary disease) ([facility])     History of continuous positive airway pressure (CPAP) therapy at home     Hx of seasonal allergies     Sleep apnea        Past Surgical History:   Procedure Laterality Date    APPENDECTOMY         Family History   Problem Relation Age of Onset    Breast cancer Mother     Kidney failure Mother     Diabetes Maternal Grandmother      I have reviewed and agree with the history as documented  E-Cigarette/Vaping    E-Cigarette Use Never User      E-Cigarette/Vaping Substances     Social History     Tobacco Use    Smoking status: Former Smoker     Packs/day: 2 00     Types: Cigarettes    Smokeless tobacco: Never Used    Tobacco comment: started at age 10   Vaping Use    Vaping Use: Never used   Substance Use Topics    Alcohol use: Yes    Drug use: Never       Review of Systems   Constitutional: Negative for chills and fever  Respiratory: Negative for cough, shortness of breath and wheezing  Cardiovascular: Negative for chest pain and palpitations  Gastrointestinal: Negative for abdominal pain, constipation, diarrhea, nausea and vomiting  Genitourinary: Negative for dysuria, flank pain, hematuria and urgency  Musculoskeletal: Negative for back pain  Skin: Positive for wound  Negative for color change and rash  All other systems reviewed and are negative  Physical Exam  Physical Exam  Vitals and nursing note reviewed     Constitutional:       Appearance: Normal appearance  He is well-developed  HENT:      Head: Normocephalic and atraumatic  Eyes:      Extraocular Movements: Extraocular movements intact  Pupils: Pupils are equal, round, and reactive to light  Pulmonary:      Effort: Pulmonary effort is normal  No respiratory distress  Musculoskeletal:         General: Tenderness present  Arms:       Cervical back: Normal range of motion and neck supple  Skin:     General: Skin is warm and dry  Capillary Refill: Capillary refill takes less than 2 seconds  Neurological:      General: No focal deficit present  Mental Status: He is alert and oriented to person, place, and time  Psychiatric:         Behavior: Behavior normal          Thought Content:  Thought content normal          Judgment: Judgment normal          Vital Signs  ED Triage Vitals [08/07/22 1558]   Temperature Pulse Respirations Blood Pressure SpO2   98 4 °F (36 9 °C) 91 (!) 24 161/90 96 %      Temp Source Heart Rate Source Patient Position - Orthostatic VS BP Location FiO2 (%)   Tympanic Monitor Sitting Right arm --      Pain Score       2           Vitals:    08/07/22 1558 08/07/22 1627   BP: 161/90 157/90   Pulse: 91 90   Patient Position - Orthostatic VS: Sitting Sitting         Visual Acuity      ED Medications  Medications   lidocaine (PF) (XYLOCAINE-MPF) 1 % injection 5 mL (5 mL Infiltration Given 8/7/22 1712)   cephalexin (KEFLEX) capsule 500 mg (500 mg Oral Given 8/7/22 1734)       Diagnostic Studies  Results Reviewed     Procedure Component Value Units Date/Time    Wound culture and Gram stain [[id number]] Collected: 08/07/22 1730    Lab Status: Preliminary result Specimen: Wound from Arm, Left Updated: 08/08/22 1339     Wound Culture Culture too young- will reincubate     Gram Stain Result No Polys or Bacteria seen                 No orders to display              Procedures  Incision and drain    Date/Time: 8/7/2022 5:00 PM  Performed by: Alberto Schneider   Authorized by: Harika Collins DO   Universal Protocol:  Consent: Verbal consent obtained  Risks and benefits: risks, benefits and alternatives were discussed  Consent given by: patient  Time out: Immediately prior to procedure a "time out" was called to verify the correct patient, procedure, equipment, support staff and site/side marked as required  Timeout called at: 8/7/2022 5:00 PM   Patient understanding: patient states understanding of the procedure being performed  Test results: test results available and properly labeled  Site marked: the operative site was marked  Required items: required blood products, implants, devices, and special equipment available  Patient identity confirmed: verbally with patient      Patient location:  ED  Location:     Type:  Abscess    Size:  3cm    Location:  Upper extremity    Upper extremity location:  L elbow  Pre-procedure details:     Skin preparation:  Chloraprep  Anesthesia (see MAR for exact dosages): Anesthesia method:  Local infiltration    Local anesthetic:  Lidocaine 1% w/o epi  Procedure details:     Complexity:  Complex    Needle aspiration: no      Incision types:  Stab incision    Scalpel blade:  11    Approach:  Open    Incision depth:  Subcutaneous    Wound management:  Probed and deloculated    Drainage:  Purulent    Drainage amount:  Scant    Wound treatment:  Wound left open    Packing materials:  None  Post-procedure details:     Patient tolerance of procedure:   Tolerated well, no immediate complications             ED Course                                             MDM  Number of Diagnoses or Management Options  Abscess of left forearm: new and requires workup  Risk of Complications, Morbidity, and/or Mortality  Presenting problems: high  Diagnostic procedures: high  Management options: high    Patient Progress  Patient progress: improved      Disposition  Final diagnoses:   Abscess of left forearm     Time reflects when diagnosis was documented in both MDM as applicable and the Disposition within this note     Time User Action Codes Description Comment    8/7/2022  5:35 PM Jorge Lozada Add [L02 [id number]] Abscess of left forearm       ED Disposition     ED Disposition   Discharge    Condition   Stable    Date/Time   Sun Aug 7, 2022  5:35 PM    Comment   Pantera Huang discharge to home/self care                 Follow-up Information     Follow up With Specialties Details Why Contact Info    Jacek Cullen PA-C Physician Assistant Schedule an appointment as soon as possible for a visit in 2 days for follow up [address]  [id number] [phone]            Discharge Medication List as of 8/7/2022  5:37 PM      START taking these medications    Details   cephalexin (KEFLEX) 500 mg capsule Take 1 capsule (500 mg total) by mouth every 6 (six) hours for 7 days, Starting Sun 8/7/2022, Until Sun 8/14/2022, Normal         CONTINUE these medications which have NOT CHANGED    Details   Albuterol Sulfate (albuterol, FOR EMS ONLY,) (2 5 mg/3 mL) 0 083 % nebulizer solution Take 2 5 mg by nebulization every 6 (six) hours as needed for wheezing, Historical Med      ASPIRIN 81 PO Take 81 mg by mouth daily, Historical Med      atorvastatin (LIPITOR) 10 mg tablet Take 10 mg by mouth daily, Starting Wed 3/11/2020, Until Thu 3/11/2021, Historical Med      fluticasone (FLONASE) 50 mcg/act nasal spray 2 sprays into each nostril daily, Historical Med      fluticasone-umeclidinium-vilanterol (Trelegy Ellipta) 100-62 5-25 MCG/INH inhaler Inhale 1 puff daily, Historical Med      loratadine (CLARITIN) 10 mg tablet TAKE ONE TABLET BY MOUTH EVERY DAY (GENERIC CLARITIN), Historical Med      predniSONE 10 mg tablet Take 40 mg/day for 3 days then 30mg/day for 3 days then 20mg/day for 3 days then 10mg/day for 3 days, Normal      tiotropium-olodaterol (Stiolto Respimat) 2 5-2 5 MCG/ACT inhaler Inhale 2 puffs daily, Starting Thu 8/27/2020, Historical Med         STOP taking these medications       amoxicillin-clavulanate (AUGMENTIN) 875-125 mg per tablet Comments:   Reason for Stopping:               No discharge procedures on file      PDMP Review     None          ED Provider  Electronically Signed by           Sarah Beasley DO  08/08/22 5119

## 2022-08-10 LAB
BACTERIA WND AEROBE CULT: ABNORMAL
GRAM STN SPEC: ABNORMAL

## 2022-08-10 RX ORDER — SULFAMETHOXAZOLE AND TRIMETHOPRIM 800; 160 MG/1; MG/1
1 TABLET ORAL 2 TIMES DAILY
Qty: 14 TABLET | Refills: 0 | Status: SHIPPED | OUTPATIENT
Start: 2022-08-10 | End: 2022-08-17

## 2025-05-29 ENCOUNTER — OFFICE VISIT (OUTPATIENT)
Dept: URGENT CARE | Facility: CLINIC | Age: 60
End: 2025-05-29
Payer: COMMERCIAL

## 2025-05-29 VITALS
SYSTOLIC BLOOD PRESSURE: 138 MMHG | BODY MASS INDEX: 41.75 KG/M2 | HEART RATE: 90 BPM | DIASTOLIC BLOOD PRESSURE: 84 MMHG | OXYGEN SATURATION: 90 % | TEMPERATURE: 97.9 F | HEIGHT: 73 IN | WEIGHT: 315 LBS | RESPIRATION RATE: 20 BRPM

## 2025-05-29 DIAGNOSIS — R06.2 WHEEZING: ICD-10-CM

## 2025-05-29 DIAGNOSIS — J01.00 ACUTE NON-RECURRENT MAXILLARY SINUSITIS: Primary | ICD-10-CM

## 2025-05-29 PROCEDURE — 99213 OFFICE O/P EST LOW 20 MIN: CPT | Performed by: PHYSICIAN ASSISTANT

## 2025-05-29 RX ORDER — LEVOTHYROXINE SODIUM 150 UG/1
150 TABLET ORAL DAILY
COMMUNITY
Start: 2025-01-27

## 2025-05-29 RX ORDER — PREDNISONE 10 MG/1
40 TABLET ORAL DAILY
Qty: 16 TABLET | Refills: 0 | Status: SHIPPED | OUTPATIENT
Start: 2025-05-29 | End: 2025-06-02

## 2025-05-29 NOTE — PROGRESS NOTES
[facility]        NAME: Peter Shelby is a 60 y.o. male  : 1965    MRN: [id number]  DATE: May 29, 2025  TIME: 7:28 PM    Assessment and Plan   Acute non-recurrent maxillary sinusitis [J01.00]  1. Acute non-recurrent maxillary sinusitis  amoxicillin-clavulanate (AUGMENTIN) 875-125 mg per tablet      2. Wheezing  predniSONE 10 mg tablet        Wheezing on exam.  Patient does have a history of COPD.  Patient is on about day 5 of symptoms.  Recommending he take the prednisone for 2 days and if he does not have improvement he may begin the antibiotic.    Patient Instructions     Patient Instructions   A sinus infection is most often caused by a virus. Treatment for a sinus infection focuses on symptomatic management as it typically resolves within 7 to 10 days. There are no treatments to shorten the clinical course of the disease. Patients with a viral sinus infection should be managed with supportive care only. Bacterial infection occurs in only 0.5 to 2 percent of episodes of sinus infections. Acute bacterial sinus infections may also be a self-limited disease and resolve without antibiotics. However, if your symptoms do last past 7 days you may take the antibotic.       According to the American Academy of Otolaryngology- Head and Neck Surgery patients with a sinus infection should have a seven day waiting period with symptomatic management. Upon day seven if there is no improvement an antibiotic should then be initiated.         Symptomatic management:     - Nasal congestion: Nasal irrigation with nasal saline or intranasal glucocorticoids (FLONASE)           Short course of oral decongestants (Sudafed) 3-5 days           (Guaifenesin) may help thin secretions and may promote ease of mucus drainage and clearance           Inhalation of warm, humidified air (steam), may provide patients with a transient sense of relief of congestion                - Pain and Fever: Over-the-counter analgesics and  antipyretics such as nonsteroidal antiinflammatory drugs (NSAIDs) and acetaminophen may be used                 Follow up with PCP in 3-5 days.  Proceed to  ER if symptoms worsen.    Chief Complaint     Chief Complaint   Patient presents with    Cold Like Symptoms     Sinus pain pressure, nasal congestion for 3 days.          History of Present Illness       The patient is a 60-year-old male presenting today for about 5 days of symptoms. Admits that he started 5 days ago with some URI symptoms.  Over the last 2 to 3 days he developed intense sinus pain and sinus pressure.  Admits to history of sinus infections since starting CPAP.  Does feel ear pressure and pain.        Review of Systems   Review of Systems   Constitutional:  Negative for activity change, appetite change, chills, diaphoresis and fever.   HENT:  Positive for congestion, sinus pressure and sinus pain. Negative for ear pain, rhinorrhea and sore throat.    Eyes:  Negative for pain and visual disturbance.   Respiratory:  Negative for cough, chest tightness and shortness of breath.    Cardiovascular:  Negative for chest pain and palpitations.   Gastrointestinal:  Negative for abdominal pain, diarrhea, nausea and vomiting.   Genitourinary:  Negative for dysuria and hematuria.   Musculoskeletal:  Negative for arthralgias, back pain and myalgias.   Skin:  Negative for color change, pallor and rash.   Neurological:  Negative for seizures, syncope and headaches.   All other systems reviewed and are negative.        Current Medications     Current Medications[1]    Current Allergies     Allergies as of 05/29/2025 - Reviewed 05/29/2025   Allergen Reaction Noted    Pollen extract Allergic Rhinitis 03/11/2020            The following portions of the patient's history were reviewed and updated as appropriate: allergies, current medications, past family history, past medical history, past social history, past surgical history and problem list.     Past Medical  "History[2]    Past Surgical History[3]    Family History[4]      Medications have been verified.        Objective   /84   Pulse 90   Temp 97.9 °F (36.6 °C)   Resp 20   Ht 6' 1\" (1.854 m)   Wt (!) 148 kg (326 lb)   SpO2 90%   BMI 43.01 kg/m²        Physical Exam     Physical Exam  Constitutional:       General: He is not in acute distress.     Appearance: Normal appearance. He is normal weight. He is not ill-appearing, toxic-appearing or diaphoretic.   HENT:      Head: Normocephalic and atraumatic.      Right Ear: Tympanic membrane, ear canal and external ear normal.      Left Ear: Tympanic membrane, ear canal and external ear normal.      Nose: Nose normal. No congestion or rhinorrhea.      Mouth/Throat:      Mouth: Mucous membranes are moist.      Pharynx: Oropharynx is clear. No oropharyngeal exudate or posterior oropharyngeal erythema.     Cardiovascular:      Rate and Rhythm: Normal rate and regular rhythm.      Heart sounds: Normal heart sounds. No murmur heard.     No friction rub. No gallop.   Pulmonary:      Effort: Pulmonary effort is normal. No respiratory distress.      Breath sounds: No stridor. Wheezing present. No rhonchi or rales.   Chest:      Chest wall: No tenderness.   Abdominal:      General: Abdomen is flat. Bowel sounds are normal. There is no distension.      Palpations: Abdomen is soft. There is no mass.      Tenderness: There is no abdominal tenderness. There is no guarding or rebound.      Hernia: No hernia is present.     Musculoskeletal:         General: Normal range of motion.      Cervical back: Normal range of motion.   Lymphadenopathy:      Cervical: No cervical adenopathy.     Skin:     General: Skin is warm and dry.      Capillary Refill: Capillary refill takes less than 2 seconds.     Neurological:      Mental Status: He is alert.                        [1]   Current Outpatient Medications:     Albuterol Sulfate (albuterol, FOR EMS ONLY,) (2.5 mg/3 mL) 0.083 % " nebulizer solution, Take 2.5 mg by nebulization every 6 (six) hours as needed for wheezing, Disp: , Rfl:     amoxicillin-clavulanate (AUGMENTIN) 875-125 mg per tablet, Take 1 tablet by mouth every 12 (twelve) hours for 7 days, Disp: 14 tablet, Rfl: 0    ASPIRIN 81 PO, Take 81 mg by mouth in the morning., Disp: , Rfl:     fluticasone-umeclidinium-vilanterol (Trelegy Ellipta) 100-62.5-25 MCG/INH inhaler, Inhale 1 puff in the morning., Disp: , Rfl:     levothyroxine 150 mcg tablet, Take 150 mcg by mouth daily, Disp: , Rfl:     loratadine (CLARITIN) 10 mg tablet, , Disp: , Rfl:     predniSONE 10 mg tablet, Take 4 tablets (40 mg total) by mouth daily for 4 days, Disp: 16 tablet, Rfl: 0    tiotropium-olodaterol (Stiolto Respimat) 2.5-2.5 MCG/ACT inhaler, Inhale 2 puffs in the morning., Disp: , Rfl:     atorvastatin (LIPITOR) 10 mg tablet, Take 10 mg by mouth daily, Disp: , Rfl:     fluticasone (FLONASE) 50 mcg/act nasal spray, 2 sprays into each nostril daily (Patient not taking: Reported on 5/29/2025), Disp: , Rfl:     predniSONE 10 mg tablet, Take 40 mg/day for 3 days then 30mg/day for 3 days then 20mg/day for 3 days then 10mg/day for 3 days (Patient not taking: Reported on 5/29/2025), Disp: 30 tablet, Rfl: 0  [2]   Past Medical History:  Diagnosis Date    COPD (chronic obstructive pulmonary disease) ([facility]CC)     History of continuous positive airway pressure (CPAP) therapy at home     Hx of seasonal allergies     Sleep apnea    [3]   Past Surgical History:  Procedure Laterality Date    APPENDECTOMY      US GUIDED THYROID BIOPSY  5/20/2022   [4]   Family History  Problem Relation Name Age of Onset    Breast cancer Mother      Kidney failure Mother      Diabetes Maternal Grandmother

## 2025-05-29 NOTE — PATIENT INSTRUCTIONS
A sinus infection is most often caused by a virus. Treatment for a sinus infection focuses on symptomatic management as it typically resolves within 7 to 10 days. There are no treatments to shorten the clinical course of the disease. Patients with a viral sinus infection should be managed with supportive care only. Bacterial infection occurs in only 0.5 to 2 percent of episodes of sinus infections. Acute bacterial sinus infections may also be a self-limited disease and resolve without antibiotics. However, if your symptoms do last past 7 days you may take the antibotic.       According to the American Academy of Otolaryngology- Head and Neck Surgery patients with a sinus infection should have a seven day waiting period with symptomatic management. Upon day seven if there is no improvement an antibiotic should then be initiated.         Symptomatic management:     - Nasal congestion: Nasal irrigation with nasal saline or intranasal glucocorticoids (FLONASE)           Short course of oral decongestants (Sudafed) 3-5 days           (Guaifenesin) may help thin secretions and may promote ease of mucus drainage and clearance           Inhalation of warm, humidified air (steam), may provide patients with a transient sense of relief of congestion                - Pain and Fever: Over-the-counter analgesics and antipyretics such as nonsteroidal antiinflammatory drugs (NSAIDs) and acetaminophen may be used